# Patient Record
Sex: FEMALE | Race: WHITE | NOT HISPANIC OR LATINO | URBAN - METROPOLITAN AREA
[De-identification: names, ages, dates, MRNs, and addresses within clinical notes are randomized per-mention and may not be internally consistent; named-entity substitution may affect disease eponyms.]

---

## 2021-10-01 ENCOUNTER — INPATIENT (INPATIENT)
Facility: HOSPITAL | Age: 81
LOS: 2 days | Discharge: HOME | DRG: 281 | End: 2021-10-04
Attending: HOSPITALIST | Admitting: STUDENT IN AN ORGANIZED HEALTH CARE EDUCATION/TRAINING PROGRAM
Payer: MEDICARE

## 2021-10-01 ENCOUNTER — EMERGENCY (EMERGENCY)
Facility: HOSPITAL | Age: 81
LOS: 0 days | Discharge: ANOTHER HOSPITAL | End: 2021-10-01
Attending: STUDENT IN AN ORGANIZED HEALTH CARE EDUCATION/TRAINING PROGRAM
Payer: MEDICARE

## 2021-10-01 VITALS
OXYGEN SATURATION: 98 % | HEART RATE: 106 BPM | RESPIRATION RATE: 18 BRPM | SYSTOLIC BLOOD PRESSURE: 131 MMHG | HEIGHT: 64 IN | DIASTOLIC BLOOD PRESSURE: 82 MMHG | WEIGHT: 119.93 LBS

## 2021-10-01 VITALS
HEART RATE: 80 BPM | RESPIRATION RATE: 18 BRPM | DIASTOLIC BLOOD PRESSURE: 72 MMHG | SYSTOLIC BLOOD PRESSURE: 151 MMHG | TEMPERATURE: 98 F | OXYGEN SATURATION: 96 %

## 2021-10-01 VITALS
SYSTOLIC BLOOD PRESSURE: 130 MMHG | TEMPERATURE: 98 F | OXYGEN SATURATION: 95 % | RESPIRATION RATE: 21 BRPM | DIASTOLIC BLOOD PRESSURE: 82 MMHG

## 2021-10-01 DIAGNOSIS — N39.0 URINARY TRACT INFECTION, SITE NOT SPECIFIED: ICD-10-CM

## 2021-10-01 DIAGNOSIS — Z96.659 PRESENCE OF UNSPECIFIED ARTIFICIAL KNEE JOINT: Chronic | ICD-10-CM

## 2021-10-01 DIAGNOSIS — I10 ESSENTIAL (PRIMARY) HYPERTENSION: ICD-10-CM

## 2021-10-01 DIAGNOSIS — Z87.81 PERSONAL HISTORY OF (HEALED) TRAUMATIC FRACTURE: Chronic | ICD-10-CM

## 2021-10-01 DIAGNOSIS — Z79.01 LONG TERM (CURRENT) USE OF ANTICOAGULANTS: ICD-10-CM

## 2021-10-01 DIAGNOSIS — Z86.73 PERSONAL HISTORY OF TRANSIENT ISCHEMIC ATTACK (TIA), AND CEREBRAL INFARCTION WITHOUT RESIDUAL DEFICITS: ICD-10-CM

## 2021-10-01 DIAGNOSIS — D69.6 THROMBOCYTOPENIA, UNSPECIFIED: ICD-10-CM

## 2021-10-01 DIAGNOSIS — I21.4 NON-ST ELEVATION (NSTEMI) MYOCARDIAL INFARCTION: ICD-10-CM

## 2021-10-01 DIAGNOSIS — R25.1 TREMOR, UNSPECIFIED: ICD-10-CM

## 2021-10-01 DIAGNOSIS — Z20.822 CONTACT WITH AND (SUSPECTED) EXPOSURE TO COVID-19: ICD-10-CM

## 2021-10-01 DIAGNOSIS — Z29.9 ENCOUNTER FOR PROPHYLACTIC MEASURES, UNSPECIFIED: ICD-10-CM

## 2021-10-01 DIAGNOSIS — R06.02 SHORTNESS OF BREATH: ICD-10-CM

## 2021-10-01 DIAGNOSIS — R07.9 CHEST PAIN, UNSPECIFIED: ICD-10-CM

## 2021-10-01 DIAGNOSIS — R07.89 OTHER CHEST PAIN: ICD-10-CM

## 2021-10-01 DIAGNOSIS — R35.0 FREQUENCY OF MICTURITION: ICD-10-CM

## 2021-10-01 LAB
ALBUMIN SERPL ELPH-MCNC: 3.8 G/DL — SIGNIFICANT CHANGE UP (ref 3.3–5)
ALP SERPL-CCNC: 97 U/L — SIGNIFICANT CHANGE UP (ref 40–120)
ALT FLD-CCNC: 32 U/L — SIGNIFICANT CHANGE UP (ref 12–78)
ANION GAP SERPL CALC-SCNC: 5 MMOL/L — SIGNIFICANT CHANGE UP (ref 5–17)
APPEARANCE UR: CLEAR — SIGNIFICANT CHANGE UP
APTT BLD: 34.5 SEC — SIGNIFICANT CHANGE UP (ref 27.5–35.5)
AST SERPL-CCNC: 58 U/L — HIGH (ref 15–37)
BACTERIA # UR AUTO: ABNORMAL
BASOPHILS # BLD AUTO: 0.04 K/UL — SIGNIFICANT CHANGE UP (ref 0–0.2)
BASOPHILS NFR BLD AUTO: 0.4 % — SIGNIFICANT CHANGE UP (ref 0–2)
BILIRUB SERPL-MCNC: 0.7 MG/DL — SIGNIFICANT CHANGE UP (ref 0.2–1.2)
BILIRUB UR-MCNC: NEGATIVE — SIGNIFICANT CHANGE UP
BUN SERPL-MCNC: 18 MG/DL — SIGNIFICANT CHANGE UP (ref 7–23)
CALCIUM SERPL-MCNC: 9.2 MG/DL — SIGNIFICANT CHANGE UP (ref 8.5–10.1)
CHLORIDE SERPL-SCNC: 109 MMOL/L — HIGH (ref 96–108)
CO2 SERPL-SCNC: 29 MMOL/L — SIGNIFICANT CHANGE UP (ref 22–31)
COLOR SPEC: YELLOW — SIGNIFICANT CHANGE UP
CREAT SERPL-MCNC: 0.86 MG/DL — SIGNIFICANT CHANGE UP (ref 0.5–1.3)
D DIMER BLD IA.RAPID-MCNC: 164 NG/ML DDU — SIGNIFICANT CHANGE UP
DIFF PNL FLD: ABNORMAL
EOSINOPHIL # BLD AUTO: 0.02 K/UL — SIGNIFICANT CHANGE UP (ref 0–0.5)
EOSINOPHIL NFR BLD AUTO: 0.2 % — SIGNIFICANT CHANGE UP (ref 0–6)
EPI CELLS # UR: SIGNIFICANT CHANGE UP
FLUAV AG NPH QL: SIGNIFICANT CHANGE UP
FLUBV AG NPH QL: SIGNIFICANT CHANGE UP
GLUCOSE SERPL-MCNC: 124 MG/DL — HIGH (ref 70–99)
GLUCOSE UR QL: NEGATIVE MG/DL — SIGNIFICANT CHANGE UP
HCT VFR BLD CALC: 43.8 % — SIGNIFICANT CHANGE UP (ref 34.5–45)
HGB BLD-MCNC: 14.3 G/DL — SIGNIFICANT CHANGE UP (ref 11.5–15.5)
IMM GRANULOCYTES NFR BLD AUTO: 0.3 % — SIGNIFICANT CHANGE UP (ref 0–1.5)
INR BLD: 1.32 RATIO — HIGH (ref 0.88–1.16)
KETONES UR-MCNC: NEGATIVE — SIGNIFICANT CHANGE UP
LEUKOCYTE ESTERASE UR-ACNC: ABNORMAL
LYMPHOCYTES # BLD AUTO: 1.33 K/UL — SIGNIFICANT CHANGE UP (ref 1–3.3)
LYMPHOCYTES # BLD AUTO: 12.2 % — LOW (ref 13–44)
MCHC RBC-ENTMCNC: 29.2 PG — SIGNIFICANT CHANGE UP (ref 27–34)
MCHC RBC-ENTMCNC: 32.6 GM/DL — SIGNIFICANT CHANGE UP (ref 32–36)
MCV RBC AUTO: 89.6 FL — SIGNIFICANT CHANGE UP (ref 80–100)
MONOCYTES # BLD AUTO: 0.69 K/UL — SIGNIFICANT CHANGE UP (ref 0–0.9)
MONOCYTES NFR BLD AUTO: 6.3 % — SIGNIFICANT CHANGE UP (ref 2–14)
NEUTROPHILS # BLD AUTO: 8.76 K/UL — HIGH (ref 1.8–7.4)
NEUTROPHILS NFR BLD AUTO: 80.6 % — HIGH (ref 43–77)
NITRITE UR-MCNC: POSITIVE
NRBC # BLD: 0 /100 WBCS — SIGNIFICANT CHANGE UP (ref 0–0)
NT-PROBNP SERPL-SCNC: 2138 PG/ML — HIGH (ref 0–450)
PH UR: 5 — SIGNIFICANT CHANGE UP (ref 5–8)
PLATELET # BLD AUTO: 92 K/UL — LOW (ref 150–400)
POTASSIUM SERPL-MCNC: 3.7 MMOL/L — SIGNIFICANT CHANGE UP (ref 3.5–5.3)
POTASSIUM SERPL-SCNC: 3.7 MMOL/L — SIGNIFICANT CHANGE UP (ref 3.5–5.3)
PROT SERPL-MCNC: 7.6 GM/DL — SIGNIFICANT CHANGE UP (ref 6–8.3)
PROT UR-MCNC: NEGATIVE MG/DL — SIGNIFICANT CHANGE UP
PROTHROM AB SERPL-ACNC: 15.1 SEC — HIGH (ref 10.6–13.6)
RBC # BLD: 4.89 M/UL — SIGNIFICANT CHANGE UP (ref 3.8–5.2)
RBC # FLD: 12.9 % — SIGNIFICANT CHANGE UP (ref 10.3–14.5)
RBC CASTS # UR COMP ASSIST: ABNORMAL /HPF (ref 0–4)
SARS-COV-2 RNA SPEC QL NAA+PROBE: SIGNIFICANT CHANGE UP
SODIUM SERPL-SCNC: 143 MMOL/L — SIGNIFICANT CHANGE UP (ref 135–145)
SP GR SPEC: 1.01 — SIGNIFICANT CHANGE UP (ref 1.01–1.02)
TROPONIN I SERPL-MCNC: 4.63 NG/ML — HIGH (ref 0.01–0.04)
TROPONIN I SERPL-MCNC: 5.3 NG/ML — HIGH (ref 0.01–0.04)
TROPONIN T, HIGH SENSITIVITY RESULT: 505 NG/L — HIGH (ref 0–51)
UROBILINOGEN FLD QL: NEGATIVE MG/DL — SIGNIFICANT CHANGE UP
WBC # BLD: 10.87 K/UL — HIGH (ref 3.8–10.5)
WBC # FLD AUTO: 10.87 K/UL — HIGH (ref 3.8–10.5)
WBC UR QL: ABNORMAL

## 2021-10-01 PROCEDURE — 71045 X-RAY EXAM CHEST 1 VIEW: CPT | Mod: 26

## 2021-10-01 PROCEDURE — 93010 ELECTROCARDIOGRAM REPORT: CPT

## 2021-10-01 PROCEDURE — 99223 1ST HOSP IP/OBS HIGH 75: CPT

## 2021-10-01 PROCEDURE — 99285 EMERGENCY DEPT VISIT HI MDM: CPT

## 2021-10-01 PROCEDURE — 70450 CT HEAD/BRAIN W/O DYE: CPT | Mod: 26,MA

## 2021-10-01 RX ORDER — ACETAMINOPHEN 500 MG
650 TABLET ORAL EVERY 6 HOURS
Refills: 0 | Status: DISCONTINUED | OUTPATIENT
Start: 2021-10-01 | End: 2021-10-04

## 2021-10-01 RX ORDER — ASPIRIN/CALCIUM CARB/MAGNESIUM 324 MG
162 TABLET ORAL ONCE
Refills: 0 | Status: COMPLETED | OUTPATIENT
Start: 2021-10-01 | End: 2021-10-01

## 2021-10-01 RX ORDER — INFLUENZA VIRUS VACCINE 15; 15; 15; 15 UG/.5ML; UG/.5ML; UG/.5ML; UG/.5ML
0.5 SUSPENSION INTRAMUSCULAR ONCE
Refills: 0 | Status: DISCONTINUED | OUTPATIENT
Start: 2021-10-01 | End: 2021-10-04

## 2021-10-01 RX ORDER — ATENOLOL 25 MG/1
0 TABLET ORAL
Qty: 0 | Refills: 0 | DISCHARGE

## 2021-10-01 RX ORDER — APIXABAN 2.5 MG/1
1 TABLET, FILM COATED ORAL
Qty: 0 | Refills: 0 | DISCHARGE

## 2021-10-01 RX ORDER — ATENOLOL 25 MG/1
1 TABLET ORAL
Qty: 0 | Refills: 0 | DISCHARGE

## 2021-10-01 RX ORDER — ATORVASTATIN CALCIUM 80 MG/1
0 TABLET, FILM COATED ORAL
Qty: 0 | Refills: 0 | DISCHARGE

## 2021-10-01 RX ORDER — CEFTRIAXONE 500 MG/1
1000 INJECTION, POWDER, FOR SOLUTION INTRAMUSCULAR; INTRAVENOUS ONCE
Refills: 0 | Status: COMPLETED | OUTPATIENT
Start: 2021-10-01 | End: 2021-10-01

## 2021-10-01 RX ORDER — APIXABAN 2.5 MG/1
0 TABLET, FILM COATED ORAL
Qty: 0 | Refills: 2 | DISCHARGE

## 2021-10-01 RX ORDER — ATORVASTATIN CALCIUM 80 MG/1
40 TABLET, FILM COATED ORAL AT BEDTIME
Refills: 0 | Status: DISCONTINUED | OUTPATIENT
Start: 2021-10-01 | End: 2021-10-04

## 2021-10-01 RX ORDER — ATENOLOL 25 MG/1
25 TABLET ORAL ONCE
Refills: 0 | Status: COMPLETED | OUTPATIENT
Start: 2021-10-01 | End: 2021-10-01

## 2021-10-01 RX ORDER — CLOPIDOGREL BISULFATE 75 MG/1
600 TABLET, FILM COATED ORAL ONCE
Refills: 0 | Status: COMPLETED | OUTPATIENT
Start: 2021-10-01 | End: 2021-10-01

## 2021-10-01 RX ORDER — HEPARIN SODIUM 5000 [USP'U]/ML
3200 INJECTION INTRAVENOUS; SUBCUTANEOUS ONCE
Refills: 0 | Status: COMPLETED | OUTPATIENT
Start: 2021-10-01 | End: 2021-10-01

## 2021-10-01 RX ORDER — CLOPIDOGREL BISULFATE 75 MG/1
75 TABLET, FILM COATED ORAL DAILY
Refills: 0 | Status: DISCONTINUED | OUTPATIENT
Start: 2021-10-02 | End: 2021-10-04

## 2021-10-01 RX ORDER — ATENOLOL 25 MG/1
25 TABLET ORAL DAILY
Refills: 0 | Status: DISCONTINUED | OUTPATIENT
Start: 2021-10-01 | End: 2021-10-04

## 2021-10-01 RX ORDER — ATORVASTATIN CALCIUM 80 MG/1
80 TABLET, FILM COATED ORAL AT BEDTIME
Refills: 0 | Status: DISCONTINUED | OUTPATIENT
Start: 2021-10-01 | End: 2021-10-01

## 2021-10-01 RX ORDER — HEPARIN SODIUM 5000 [USP'U]/ML
3200 INJECTION INTRAVENOUS; SUBCUTANEOUS EVERY 6 HOURS
Refills: 0 | Status: DISCONTINUED | OUTPATIENT
Start: 2021-10-01 | End: 2021-10-03

## 2021-10-01 RX ORDER — POTASSIUM CHLORIDE 20 MEQ
0 PACKET (EA) ORAL
Qty: 0 | Refills: 0 | DISCHARGE

## 2021-10-01 RX ORDER — ASPIRIN/CALCIUM CARB/MAGNESIUM 324 MG
81 TABLET ORAL DAILY
Refills: 0 | Status: DISCONTINUED | OUTPATIENT
Start: 2021-10-02 | End: 2021-10-04

## 2021-10-01 RX ORDER — HEPARIN SODIUM 5000 [USP'U]/ML
INJECTION INTRAVENOUS; SUBCUTANEOUS
Qty: 25000 | Refills: 0 | Status: DISCONTINUED | OUTPATIENT
Start: 2021-10-01 | End: 2021-10-03

## 2021-10-01 RX ORDER — LANOLIN ALCOHOL/MO/W.PET/CERES
3 CREAM (GRAM) TOPICAL AT BEDTIME
Refills: 0 | Status: DISCONTINUED | OUTPATIENT
Start: 2021-10-01 | End: 2021-10-04

## 2021-10-01 RX ORDER — LOSARTAN/HYDROCHLOROTHIAZIDE 100MG-25MG
0 TABLET ORAL
Qty: 0 | Refills: 1 | DISCHARGE

## 2021-10-01 RX ADMIN — HEPARIN SODIUM 650 UNIT(S)/HR: 5000 INJECTION INTRAVENOUS; SUBCUTANEOUS at 21:16

## 2021-10-01 RX ADMIN — CEFTRIAXONE 100 MILLIGRAM(S): 500 INJECTION, POWDER, FOR SOLUTION INTRAMUSCULAR; INTRAVENOUS at 15:06

## 2021-10-01 RX ADMIN — CLOPIDOGREL BISULFATE 600 MILLIGRAM(S): 75 TABLET, FILM COATED ORAL at 22:14

## 2021-10-01 RX ADMIN — HEPARIN SODIUM 3200 UNIT(S): 5000 INJECTION INTRAVENOUS; SUBCUTANEOUS at 21:16

## 2021-10-01 RX ADMIN — Medication 162 MILLIGRAM(S): at 14:17

## 2021-10-01 RX ADMIN — Medication 162 MILLIGRAM(S): at 15:15

## 2021-10-01 RX ADMIN — CEFTRIAXONE 1000 MILLIGRAM(S): 500 INJECTION, POWDER, FOR SOLUTION INTRAMUSCULAR; INTRAVENOUS at 15:36

## 2021-10-01 RX ADMIN — ATORVASTATIN CALCIUM 40 MILLIGRAM(S): 80 TABLET, FILM COATED ORAL at 22:13

## 2021-10-01 NOTE — CONSULT NOTE ADULT - ATTENDING COMMENTS
Ms. Escmailla reports no chest discomfort at the time of my evaluation.  Continue DAPT and heparin gtt.  Plan fo [facility].

## 2021-10-01 NOTE — H&P ADULT - NSHPLABSRESULTS_GEN_ALL_CORE
LABS: Personally reviewed labs, imaging, and ECG                          14.3   10.87 )-----------( 92       ( 01 Oct 2021 11:41 )             43.8       10    143  |  109<H>  |  18  ----------------------------<  124<H>  3.7   |  29  |  0.86    Ca    9.2      01 Oct 2021 11:41    TPro  7.6  /  Alb  3.8  /  TBili  0.7  /  DBili  x   /  AST  58<H>  /  ALT  32  /  AlkPhos  97  10-       LIVER FUNCTIONS - ( 01 Oct 2021 11:41 )  Alb: 3.8 g/dL / Pro: 7.6 gm/dL / ALK PHOS: 97 U/L / ALT: 32 U/L / AST: 58 U/L / GGT: x                    Urinalysis Basic - ( 01 Oct 2021 14:15 )    Color: Yellow / Appearance: Clear / S.010 / pH: x  Gluc: x / Ketone: Negative  / Bili: Negative / Urobili: Negative mg/dL   Blood: x / Protein: Negative mg/dL / Nitrite: Positive   Leuk Esterase: Trace / RBC: 3-5 /HPF / WBC 6-10   Sq Epi: x / Non Sq Epi: Few / Bacteria: Many        PT/INR - ( 01 Oct 2021 11:41 )   PT: 15.1 sec;   INR: 1.32 ratio         PTT - ( 01 Oct 2021 11:41 )  PTT:34.5 sec    Lactate Trend      CARDIAC MARKERS ( 01 Oct 2021 16:22 )  4.630 ng/mL / x     / x     / x     / x      CARDIAC MARKERS ( 01 Oct 2021 11:41 )  5.300 ng/mL / x     / x     / x     / x            CAPILLARY BLOOD GLUCOSE                RADIOLOGY & ADDITIONAL TESTS: LABS: Personally reviewed labs, imaging, and ECG                          14.3   10.87 )-----------( 92       ( 01 Oct 2021 11:41 )             43.8       10    143  |  109<H>  |  18  ----------------------------<  124<H>  3.7   |  29  |  0.86    Ca    9.2      01 Oct 2021 11:41    TPro  7.6  /  Alb  3.8  /  TBili  0.7  /  DBili  x   /  AST  58<H>  /  ALT  32  /  AlkPhos  97  10       LIVER FUNCTIONS - ( 01 Oct 2021 11:41 )  Alb: 3.8 g/dL / Pro: 7.6 gm/dL / ALK PHOS: 97 U/L / ALT: 32 U/L / AST: 58 U/L / GGT: x                    Urinalysis Basic - ( 01 Oct 2021 14:15 )    Color: Yellow / Appearance: Clear / S.010 / pH: x  Gluc: x / Ketone: Negative  / Bili: Negative / Urobili: Negative mg/dL   Blood: x / Protein: Negative mg/dL / Nitrite: Positive   Leuk Esterase: Trace / RBC: 3-5 /HPF / WBC 6-10   Sq Epi: x / Non Sq Epi: Few / Bacteria: Many        PT/INR - ( 01 Oct 2021 11:41 )   PT: 15.1 sec;   INR: 1.32 ratio         PTT - ( 01 Oct 2021 11:41 )  PTT:34.5 sec    Lactate Trend      CARDIAC MARKERS ( 01 Oct 2021 16:22 )  4.630 ng/mL / x     / x     / x     / x      CARDIAC MARKERS ( 01 Oct 2021 11:41 )  5.300 ng/mL / x     / x     / x     / x          EKG:  ST depressions in V4-V6  PVC's and PAC      RADIOLOGY & ADDITIONAL TESTS:  < from: CT Head No Cont (10.01.21 @ 12:35) >    IMPRESSION:    No hydrocephalus, acute intracranial hemorrhage, mass effect, or brain edema.    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    Right mastoid air cell effusion, correlate for the presence of mastoiditis.    --- End of Report ---    < end of copied text >      < from: Xray Chest 1 View- PORTABLE-Urgent (10.01.21 @ 11:18) >    Heart is magnified by technique.    No lung or pleural finding evident.    There is possible lung hyperexpansion.    IMPRESSION: Question hyperexpanded lungs.    < end of copied text >

## 2021-10-01 NOTE — H&P ADULT - NSHPPHYSICALEXAM_GEN_ALL_CORE
Vital Signs Last 24 Hrs  T(C): 36.7 (01 Oct 2021 18:05), Max: 36.8 (01 Oct 2021 12:02)  T(F): 98.1 (01 Oct 2021 18:05), Max: 98.2 (01 Oct 2021 12:02)  HR: 80 (01 Oct 2021 18:05) (73 - 106)  BP: 151/72 (01 Oct 2021 18:05) (130/82 - 151/72)  BP(mean): --  RR: 18 (01 Oct 2021 18:05) (12 - 21)  SpO2: 96% (01 Oct 2021 18:05) (95% - 98%)    Physical Exam:  Gen: Alert, well-developed, NAD  HEENT: NCAT, PERRL, EOMI, clear conjunctiva, no scleral icterus, no erythema or exudates in the oropharynx, mmm  Neck: Supple, no JVD, no LAD  CV: RRR, S1S2, no m/r/g  Resp: CTAB, normal respiratory effort  Abd: Soft, NT, ND, normal bowel sounds  Ext: no edema, no clubbing or cyanosis  Neuro: AOx3, CN2-12 grossly intact, SUÁREZ  Skin: warm, perfused Vital Signs Last 24 Hrs  T(C): 36.7 (01 Oct 2021 18:05), Max: 36.8 (01 Oct 2021 12:02)  T(F): 98.1 (01 Oct 2021 18:05), Max: 98.2 (01 Oct 2021 12:02)  HR: 80 (01 Oct 2021 18:05) (73 - 106)  BP: 151/72 (01 Oct 2021 18:05) (130/82 - 151/72)  BP(mean): --  RR: 18 (01 Oct 2021 18:05) (12 - 21)  SpO2: 96% (01 Oct 2021 18:05) (95% - 98%)    Physical Exam:  Gen: Alert, well-developed, NAD  HEENT: NCAT, EOMI, clear conjunctiva, no scleral icterus, no erythema or exudates in the oropharynx, mmm  Neck: no JVD  CV: RRR, S1S2, no murmurs  Resp: CTAB, normal respiratory effort  Abd: Soft, NT, ND  Ext: no edema.  Surgical scar on L knee. LLE smaller than RLE, appears atrophied. no clubbing or cyanosis  Neuro: AOx3, CN2-12 grossly intact, SUÁREZ  Skin: warm, perfused

## 2021-10-01 NOTE — CONSULT NOTE ADULT - SUBJECTIVE AND OBJECTIVE BOX
Patient seen and evaluated at bedside    Chief Complaint:    HPI:  82 yo F with hx of HTN, TIA on Eliquis 2.5 BID for the past 6 yrs, rheumatic MV stenosis, presented to [place] ED for mild SOB and "chest discomfort, no pain" that radiated to bilateral UE. Patient was sitting down and reading a book when this happened and since then had multiple episodes lasting about 30 mins each. The discomfort is so mild that she did not initially come to the hospital. However, yesterday had an episode with her friend when she was "shaking", patient does not remember the episode, which prompted her to come to the hospital. Currently, she denies this chest discomfort and denies SOB. Troponin I at OSH was 5.3 initially that down-trended to 4.6. EKG showed STD in v3-v6 and II, III, and avF. She was loaded with asa 325 and accepted by Dr. Billingsley to [facility] for LHC.     PMHx:   CVA (cerebrovascular accident)  HTN (hypertension)    Current Medications:   acetaminophen   Tablet .. 650 milliGRAM(s) Oral every 6 hours PRN  influenza   Vaccine 0.5 milliLiter(s) IntraMuscular once  melatonin 3 milliGRAM(s) Oral at bedtime PRN    REVIEW OF SYSTEMS:  Constitutional:     [x ] negative [ ] fevers [ ] chills [ ] weight loss [ ] weight gain  HEENT:                  [x ] negative [ ] dry eyes [ ] eye irritation [ ] postnasal drip [ ] nasal congestion  CV:                         [ x] negative  [ ] chest pain [ ] orthopnea [ ] palpitations [ ] murmur  Resp:                     [x ] negative [ ] cough [ ] shortness of breath [ ] dyspnea [ ] wheezing [ ] sputum [ ]hemoptysis  GI:                          [ x] negative [ ] nausea [ ] vomiting [ ] diarrhea [ ] constipation [ ] abd pain [ ] dysphagia   :                        [ x] negative [ ] dysuria [ ] nocturia [ ] hematuria [ ] increased urinary frequency  Musculoskeletal: [x ] negative [ ] back pain [ ] myalgias [ ] arthralgias [ ] fracture  Skin:                       [ x] negative [ ] rash [ ] itch  Neurological:        [ x] negative [ ] headache [ ] dizziness [ ] syncope [ ] weakness [ ] numbness  Psychiatric:           [ x] negative [ ] anxiety [ ] depression  Endocrine:            [ x] negative [ ] diabetes [ ] thyroid problem  Heme/Lymph:      [ x] negative [ ] anemia [ ] bleeding problem  Allergic/Immune: [ x] negative [ ] itchy eyes [ ] nasal discharge [ ] hives [ ] angioedema    [ x] All other systems negative  [ ] Unable to assess ROS due to      Physical Exam:  T(F): 98.1 (10-01), Max: 98.2 (10-01)  HR: 80 (10-01) (73 - 106)  BP: 151/72 (10-01) (130/82 - 151/72)  RR: 18 (10-01)  SpO2: 96% (10-01)  GENERAL: No acute distress, well-developed  HEAD:  Atraumatic, Normocephalic  ENT: EOMI, PERRLA, conjunctiva and sclera clear, Neck supple, No JVD, moist mucosa  CHEST/LUNG: Clear to auscultation bilaterally; No wheeze, equal breath sounds bilaterally   BACK: No spinal tenderness  HEART: Regular rate and rhythm; No murmurs, rubs, or gallops  ABDOMEN: Soft, Nontender, Nondistended; Bowel sounds present  EXTREMITIES:  No clubbing, cyanosis, or edema  PSYCH: Nl behavior, nl affect  NEUROLOGY: AAOx3, non-focal, cranial nerves intact  SKIN: Normal color, No rashes or lesions  LINES:    Cardiovascular Diagnostic Testing:`    ECG: Personally reviewed:    Imaging:    CXR: Personally reviewed    Labs: Personally reviewed                        14.3   10.87 )-----------( 92       ( 01 Oct 2021 11:41 )             43.8     10-01    143  |  109<H>  |  18  ----------------------------<  124<H>  3.7   |  29  |  0.86    Ca    9.2      01 Oct 2021 11:41    TPro  7.6  /  Alb  3.8  /  TBili  0.7  /  DBili  x   /  AST  58<H>  /  ALT  32  /  AlkPhos  97  10-01    PT/INR - ( 01 Oct 2021 11:41 )   PT: 15.1 sec;   INR: 1.32 ratio         PTT - ( 01 Oct 2021 11:41 )  PTT:34.5 sec  Serum Pro-Brain Natriuretic Peptide: 2138 pg/mL (10-01 @ 11:41)

## 2021-10-01 NOTE — ED ADULT NURSE NOTE - OBJECTIVE STATEMENT
Pt presents to ED c/o chest pressure, SOB X2 days. Pt also states the she had "the shakes" in both of her arms yesterday. Pt reports hx of TIAs. She denies dizziness, CP, N/V/D, fever/chills. Neuro intact. Respirations even and unlabored. NAD noted. 12 lead EKG completed. Daughter at bedside.

## 2021-10-01 NOTE — H&P ADULT - PROBLEM SELECTOR PLAN 3
Episode of shaking prior to ED presentation. No alteration in mental status, LOC, confusion per patient.  Possibly chills vs. seizure.    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  - monitor for fevers Thrombocytopenia to 92. Possibly consumptive iso MI.    - Heparin gtt to be started on ACS  - Monitor CBC q12h Thrombocytopenia to 92. Possibly consumptive iso MI.    - Heparin gtt to be started on ACS  - Check CBC daily

## 2021-10-01 NOTE — ED PROVIDER NOTE - MUSCULOSKELETAL, MLM
Spine appears normal, range of motion is not limited, no muscle or joint tenderness. no leg swelling or ttp

## 2021-10-01 NOTE — ED ADULT TRIAGE NOTE - CHIEF COMPLAINT QUOTE
82 y/o female with PMH of right eye retinal detatchment, TIA (2020), HLD. Presents to the ED with c/c of heaviness in the chest, HONG, and not feeling well ongoing for the past 2 days. 82 y/o female with PMH of right eye retinal detatchment, TIA (on eliquis, 2020), HLD. Presents to the ED with c/c of heaviness in the chest, HONG, and not feeling well ongoing for the past 2 days.

## 2021-10-01 NOTE — H&P ADULT - NSHPREVIEWOFSYSTEMS_GEN_ALL_CORE
General: No fevers, chills, fatigue, weight loss  HEENT: No headaches, acute visual changes, rhinorrhea, sore throat, dysphagia  CVS: No chest pain, palpitations  Resp: No cough, dyspnea, wheezing  GI: No abdominal pain, nausea, vomiting, constipation, diarrhea, hematochezia, melena  : No dysuria, frequency, hematuria, or discharge  MSK: No joint pain or swelling  Ext: No edema, no claudication  Skin: No rashes or itching  Heme: No easy bruising or petechiae  Neuro: No confusion, numbness, focal weakness, or loss of consciousness  Endocrine: No excessive heat or cold symptoms, polyuria, polydipsia General: +chills/shaking. No fevers, fatigue, weight loss  HEENT: No headaches, acute visual changes, rhinorrhea, sore throat, dysphagia  CVS: + Chest discomfort. No palpitations  Resp: No cough, dyspnea, wheezing  GI: No abdominal pain, nausea, vomiting, constipation, diarrhea, hematochezia  : + frequency.  No dysuria, hematuria, or discharge  MSK: No joint pain or swelling  Ext: No edema or pain, no claudication  Skin: No rashes or itching  Heme: No easy bruising or petechiae  Neuro: No confusion, numbness, focal weakness, or loss of consciousness  Endocrine: No excessive heat or cold symptoms

## 2021-10-01 NOTE — ED ADULT NURSE NOTE - CHIEF COMPLAINT QUOTE
80 y/o female with PMH of right eye retinal detatchment, TIA (on eliquis, 2020), HLD. Presents to the ED with c/c of heaviness in the chest, HONG, and not feeling well ongoing for the past 2 days.

## 2021-10-01 NOTE — H&P ADULT - ASSESSMENT
81F Hx of TIA (on eliquis 2.5mg BID), Mitral Stenosis d/t rheumatic fever transferred from [facility] for chest discomfort, EKG with ST depressions in V4-V6 and trop elevation consistent with NSTEMI. S/p ASA load.

## 2021-10-01 NOTE — ED PROVIDER NOTE - OBJECTIVE STATEMENT
81f pmhx HTN, CVA/TIA on eliquis daily which she is compliant with presenting for feeling unwell for several days. feels mildly sob with discomfort in her right arm at times. no leg swelling or pain. no exertional component to symptoms. yesterday she had an episode where she reportedly looked nonresponsive, patinet does not remember the episode. asymptomatic today. also experiencing urinary frequency, thoiugh that is chronic. no dysuria or hematuria.

## 2021-10-01 NOTE — ED PROVIDER NOTE - CLINICAL SUMMARY MEDICAL DECISION MAKING FREE TEXT BOX
ed workup significnat for ST depressions in inferolateral leads and troponin elevation to 5.3; no active chest pain or sob in ED. UA indicative of UTI, discussed with Cath lab attending Dr. Billingsley, per reccomendations will transfer to [facility] in preparation for cath; aspirin given; mild thrombocytopenia noted, no bleeding. eliquis last taken this AM

## 2021-10-01 NOTE — CONSULT NOTE ADULT - ASSESSMENT
80 yo F with hx of HTN and CVA presented as a NSTEMI from OSH for LHC.    #NSTEMI  - start hep gtt ACS protocol, hold home Eliquis  - s/p asa load, continue asa 81 daily  - load with plavix 600 x1, then continue plavix 75 daily thereafter (if not already done at OSH)  - would check troponin x2 here  - TTE in the am  - check a1c, lipids, if not recently checked  - can continue home atenolol to maintain HR between 50-60, up-titrate prn  - continue home atorvastatin 40 daily  - monitor on telemetry  - keep K > 4, Mg > 2  - if chest discomfort returns, can trial SL nitro or low dose nitro patch and please notify cardiology  - will get further ischemic eval, likely LHC, in the am    Miriam Franco MD  Cardiology Fellow - PGY 4  Text or Call: [phone]  For all New Consults and Questions:  [email or web address]   Login: [email or web address] 80 yo F with hx of HTN and CVA presented as a NSTEMI from OSH for LHC.    #NSTEMI  - start hep gtt ACS protocol, hold home Eliquis  - s/p asa load, continue asa 81 daily  - load with plavix 600 x1, then continue plavix 75 daily thereafter (if not already done at OSH)  - would check troponin x2 here  - TTE in the am  - check a1c, lipids, if not recently checked  - can continue home atenolol to maintain HR between 50-60, up-titrate prn  - continue home atorvastatin 40 daily  - hold home losartan/HCTZ for now  - monitor on telemetry  - keep K > 4, Mg > 2  - if chest discomfort returns, can trial SL nitro or low dose nitro patch and please notify cardiology  - will get further ischemic eval, likely LHC, in the am    Miriam Franco MD  Cardiology Fellow - PGY 4  Text or Call: [phone]  For all New Consults and Questions:  [email or web address]   Login: [email or web address]

## 2021-10-01 NOTE — H&P ADULT - HISTORY OF PRESENT ILLNESS
81F Hx of HTN, CVA/TIA on eliquis 2.5mg BID? presented to [place] ED for mild SOB with R arm discomfort for several days. no leg swelling or pain. no exertional component to symptoms. Yesterday she had an episode where she reportedly looked nonresponsive, patient does not remember the episode. Asymptomatic today. Also experiencing urinary frequency, though it is chronic. Denies dysuria or hematuria. 81F Hx of HTN, CVA/TIA on eliquis 2.5mg BID presented to [place] ED for mild SOB with R arm discomfort for several days. no leg swelling or pain. no exertional component to symptoms. Yesterday she had an episode where she reportedly looked nonresponsive, patient does not remember the episode. Asymptomatic today. Also experiencing urinary frequency, though it is chronic. Denies dysuria or hematuria.    S/p Aspirin 162mg x2 at [facility] ED. S/p Ceftriaxone 1g.  81F Hx of HTN, CVA/TIA on eliquis 2.5mg BID presented to [place] ED for mild SOB with R arm discomfort for several days. no leg swelling or pain. no exertional component to symptoms. Yesterday she had an episode where she reportedly looked nonresponsive, patient does not remember the episode. Asymptomatic today. Also experiencing urinary frequency, though it is chronic. Denies dysuria or hematuria.    S/p Aspirin 162mg x2 at [facility] ED (10/1 13:40, 14:40). S/p Ceftriaxone 1g.  81F Hx of TIA on eliquis 2.5mg BID for 6 years, rheumatic fever with mitral stenosis, presented to [place] ED for mild SOB with R arm discomfort for several days. no leg swelling or pain. no exertional component to symptoms. Yesterday she had an episode where she reportedly looked nonresponsive, patient does not remember the episode. Asymptomatic today. Also experiencing urinary frequency, though it is chronic. Denies dysuria or hematuria.  chest discomfort intermittent for seversl days 30 minutes at a time   both arms discomfort  currently asymptomatic  S/p Aspirin 162mg x2 at [facility] ED (10/1 13:40, 14:40). S/p Ceftriaxone 1g.  81F Hx of TIA ~ 10 yrs ag (on eliquis 2.5mg BID) for 6 years, Mitral Stenosis d/t rheumatic fever in childhood presented to [place] ED on 10/1 for intermittent chest discomfort bilateral arm discomfort for several days. She was urged by family to go to hospital after she was found have be "shaking" in her upper body for a few seconds.  She was standing up at the time, denies noticing any chills at this time, denies LOC or confusion afterwards. She reports shes noticied mild chest discomfort intermittently throughout last several days, lasting about 30 minutes at a time. Had associated discomfort in bilateral arms.  Denies CP, SOB, leg swelling or pain. No exertional component to symptoms.  Reports she does not have any symptoms at this time.  Admits to experiencing increased urinary frequency x ~ 1 month throughout the night (waking up q1h).  Denies dysuria or hematuria.  Denies fevers.    In [facility] ED:  EKG inferolateral ST depressions and trop-i elevated.  S/p Aspirin 162mg x2 at [facility] ED (10/1 13:40, 14:40).  UA positive, s/p Ceftriaxone 1g.  Accepted by Dr. Billingsley at [facility] for transfer for NSTEMI.  Upon arrival: Af, HDS. 81F Hx of TIA ~10 yrs ago (on eliquis 2.5mg BID), Mitral Stenosis d/t rheumatic fever in childhood, L hip fracture 5 years ago not amenable to surgery (uses cane) presented to [place] ED on 10/1 for intermittent chest discomfort bilateral arm discomfort for several days. She was urged by family to go to hospital after she was found have be "shaking" in her upper body for a few seconds.  She was standing up at the time, denies noticing any chills at this time, denies LOC or confusion afterwards. She reports shes noticied mild chest discomfort intermittently throughout last several days, lasting about 30 minutes at a time. Had associated discomfort in bilateral arms.  Denies CP, SOB, leg swelling or pain. No exertional component to symptoms.  Reports she does not have any symptoms at this time.  Last TTE last year. Admits to experiencing increased urinary frequency x ~ 1 month throughout the night (waking up q1h).  Denies dysuria or hematuria.  Denies fevers.    In [facility] ED:  EKG inferolateral ST depressions and trop-i elevated.  S/p Aspirin 162mg x2 at [facility] ED (10/1 13:40, 14:40).  UA positive, s/p Ceftriaxone 1g.  Accepted by Dr. Billingsley at [facility] for transfer for NSTEMI.  Upon arrival: Af, HDS.

## 2021-10-01 NOTE — H&P ADULT - PROBLEM SELECTOR PLAN 1
Intermittent chest discomfort with bilateral arm discomfort for several days. EKG with ST depressions in V4-V6 and trop elevation consistent with NSTEMI. Trop-i 5.3 -> 4.6.     - S/p  x2 at [facility]. Continue ASA 81mg daily.  - Will start heparin gtt  - Atorvastatin 80mg QHS  - Monitor on telemetry  - TTE ordered  - Cardiology made aware, follow recs re: DAPT and cath plans Intermittent chest discomfort with bilateral arm discomfort for several days. EKG with ST depressions in V4-V6 and trop elevation consistent with NSTEMI. Trop-i 5.3 -> 4.6.     - S/p  x2 at [facility]. Continue ASA 81mg daily.  - Will start heparin gtt  - Atorvastatin 80mg QHS  - Monitor on telemetry  - TTE ordered  - Monitor I/Os, weight  - Cardiology made aware, follow recs re: DAPT and cath plans Intermittent chest discomfort with bilateral arm discomfort for several days. EKG with ST depressions in V4-V6 and trop elevation consistent with NSTEMI. Trop-i 5.3 -> 4.6.     - S/p  x2 at [facility]. Continue ASA 81mg daily.  - Will start heparin gtt  - Will start plavix load 600mg, then maintenance 75mg daily  - Atorvastatin 40mg QHS  - Monitor on telemetry  - TTE ordered.  Monitor I/Os, daily weight.  - Check lipids, A1c  - C/w home atenolol 25mg daily to maintain HR 50-60  - Hold losartan-HCTZ 5-12.5 daily   - F/u Cardiology recs re: DAPT and cath plans Intermittent chest discomfort with bilateral arm discomfort for several days. EKG with ST depressions in V4-V6 and trop elevation consistent with NSTEMI. Trop-i 5.3 -> 4.6.     - S/p  x2 at [facility]. Continue ASA 81mg daily.  - Will start heparin gtt  - Will start plavix load 600mg, then maintenance 75mg daily  - Atorvastatin 40mg QHS  - Monitor on telemetry  - TTE ordered.  Monitor I/Os, daily weight.  - Check lipids, A1c  - C/w home atenolol 25mg daily to maintain HR 50-60  - Hold losartan-HCTZ 5-12.5 daily   - F/u Cardiology recs re: LHC (possibly in AM)

## 2021-10-01 NOTE — H&P ADULT - PROBLEM SELECTOR PLAN 5
Diet: DASH  DVT ppx: will start heparin gtt  PT consult (uses cane to walk s/p L hip fx 5 years ago) Hx of TIA 10 years ago, has been on eliquis. 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  With hx of mitral stenosis/rheumatic valve disease.    - On eliquis at home. Will start heparin gtt (for NSTEMI)  - ASA 81mg daily  - Atorvastatin 80mg QHS

## 2021-10-01 NOTE — H&P ADULT - NSHPSOCIALHISTORY_GEN_ALL_CORE
Marital Status:  (   )    (   ) Single    (   )    (  )   Occupation:   Lives with: (  ) alone  (  ) children   (  ) spouse   (  ) parents  (  ) other  Recent Travel:     Substance Use (street drugs): (  ) never used  (  ) other:  Tobacco Usage:  (   ) never smoked   (   ) former smoker   (   ) current smoker  (     ) pack year  Alcohol Usage:  Sexual History: Marital Status:  (   )    (   ) Single    (   )    (  )   Occupation:   Lives with: (  ) alone  (  ) children   (  ) spouse   (  ) parents  (  ) other  Recent Travel:     hip fracture unable to be operated on - uses cane to walk 5 years ago     Substance Use (street drugs): (  ) never used  (  ) other:  Tobacco Usage:  (   ) never smoked   (   ) former smoker   (   ) current smoker  (     ) pack year  Alcohol Usage:  Sexual History: Marital Status:  (   )    (   ) Single    (   )    ( x )    Lives with: ( x ) alone  (  ) children   (  ) spouse   (  ) parents  (  ) other  With daughter who lives very close    S/p L hip fracture 5 years ago, uses cane to walk     Substance Use (street drugs): ( x ) never used  (  ) other:  Tobacco Usage:  ( x  ) never smoked   (   ) former smoker   (   ) current smoker  (     ) pack year  Alcohol Usage: none    COVID vaccinated moderna x2

## 2021-10-01 NOTE — ED PROVIDER NOTE - CARE PLAN
[facility] Mental Status Exam ([facility])   Score: 8   [place]:   High school educated: 27-30 Normal; 21-26 Mild cognitive impairment; 1-20 Dementia  Less than high school educated: 25-30 Normal; 20-24 Mild cognitive impairment; 1-19 Dementia     1 Principal Discharge DX:	NSTEMI (non-ST elevation myocardial infarction)  Secondary Diagnosis:	Acute UTI

## 2021-10-01 NOTE — H&P ADULT - PROBLEM SELECTOR PLAN 2
UA positive, with increased urinary frequency    - S/p Ceftriaxone 1g at [facility]  - c/w Ceftriaxone 1g QD for total 3 days

## 2021-10-01 NOTE — H&P ADULT - ATTENDING COMMENTS
81F with PMH of rheumatic heart disease, MS, TIA presents as a transfer for NSTEMI. Patient has presented at OSH for chest discomfort and was found to have elevated troponin and EKG changes (ST depressions in the inferolateral leads). Patient was transferred for NSTEMI. Cards recs appreciated, started on heparin drip, aspirin and plavix loaded. Continue home atenolol for HR goal of 50-60. F/u TTE, check lipid panel. Keep K > 4, Mg > 2. Plan for cath in am per cards recs.

## 2021-10-01 NOTE — H&P ADULT - NSICDXPASTMEDICALHX_GEN_ALL_CORE_FT
PAST MEDICAL HISTORY:  CVA (cerebrovascular accident)     HTN (hypertension)      PAST MEDICAL HISTORY:  Brain TIA ~10 yrs ago    HTN (hypertension)

## 2021-10-01 NOTE — H&P ADULT - PROBLEM SELECTOR PLAN 6
Diet: DASH  DVT ppx: will start heparin gtt  PT consult (uses cane to walk s/p L hip fx 5 years ago)

## 2021-10-02 LAB
A1C WITH ESTIMATED AVERAGE GLUCOSE RESULT: 5.9 % — HIGH (ref 4–5.6)
ALBUMIN SERPL ELPH-MCNC: 4.2 G/DL — SIGNIFICANT CHANGE UP (ref 3.3–5)
ALP SERPL-CCNC: 91 U/L — SIGNIFICANT CHANGE UP (ref 40–120)
ALT FLD-CCNC: 22 U/L — SIGNIFICANT CHANGE UP (ref 10–45)
ANION GAP SERPL CALC-SCNC: 17 MMOL/L — SIGNIFICANT CHANGE UP (ref 5–17)
APTT BLD: 127.2 SEC — CRITICAL HIGH (ref 27.5–35.5)
APTT BLD: 64.3 SEC — HIGH (ref 27.5–35.5)
APTT BLD: 64.6 SEC — HIGH (ref 27.5–35.5)
AST SERPL-CCNC: 45 U/L — HIGH (ref 10–40)
BILIRUB SERPL-MCNC: 0.8 MG/DL — SIGNIFICANT CHANGE UP (ref 0.2–1.2)
BUN SERPL-MCNC: 14 MG/DL — SIGNIFICANT CHANGE UP (ref 7–23)
CALCIUM SERPL-MCNC: 9.6 MG/DL — SIGNIFICANT CHANGE UP (ref 8.4–10.5)
CHLORIDE SERPL-SCNC: 103 MMOL/L — SIGNIFICANT CHANGE UP (ref 96–108)
CHOLEST SERPL-MCNC: 168 MG/DL — SIGNIFICANT CHANGE UP
CO2 SERPL-SCNC: 22 MMOL/L — SIGNIFICANT CHANGE UP (ref 22–31)
COVID-19 SPIKE DOMAIN AB INTERP: POSITIVE
COVID-19 SPIKE DOMAIN ANTIBODY RESULT: >250 U/ML — HIGH
CREAT SERPL-MCNC: 0.7 MG/DL — SIGNIFICANT CHANGE UP (ref 0.5–1.3)
ESTIMATED AVERAGE GLUCOSE: 123 MG/DL — HIGH (ref 68–114)
GLUCOSE SERPL-MCNC: 107 MG/DL — HIGH (ref 70–99)
HCT VFR BLD CALC: 40.3 % — SIGNIFICANT CHANGE UP (ref 34.5–45)
HCT VFR BLD CALC: 41.7 % — SIGNIFICANT CHANGE UP (ref 34.5–45)
HDLC SERPL-MCNC: 51 MG/DL — SIGNIFICANT CHANGE UP
HGB BLD-MCNC: 13.4 G/DL — SIGNIFICANT CHANGE UP (ref 11.5–15.5)
HGB BLD-MCNC: 13.5 G/DL — SIGNIFICANT CHANGE UP (ref 11.5–15.5)
LIPID PNL WITH DIRECT LDL SERPL: 103 MG/DL — HIGH
MAGNESIUM SERPL-MCNC: 1.9 MG/DL — SIGNIFICANT CHANGE UP (ref 1.6–2.6)
MCHC RBC-ENTMCNC: 29.5 PG — SIGNIFICANT CHANGE UP (ref 27–34)
MCHC RBC-ENTMCNC: 29.6 PG — SIGNIFICANT CHANGE UP (ref 27–34)
MCHC RBC-ENTMCNC: 32.4 GM/DL — SIGNIFICANT CHANGE UP (ref 32–36)
MCHC RBC-ENTMCNC: 33.3 GM/DL — SIGNIFICANT CHANGE UP (ref 32–36)
MCV RBC AUTO: 89.2 FL — SIGNIFICANT CHANGE UP (ref 80–100)
MCV RBC AUTO: 91.2 FL — SIGNIFICANT CHANGE UP (ref 80–100)
NON HDL CHOLESTEROL: 117 MG/DL — SIGNIFICANT CHANGE UP
NRBC # BLD: 0 /100 WBCS — SIGNIFICANT CHANGE UP (ref 0–0)
NRBC # BLD: 0 /100 WBCS — SIGNIFICANT CHANGE UP (ref 0–0)
PHOSPHATE SERPL-MCNC: 2.8 MG/DL — SIGNIFICANT CHANGE UP (ref 2.5–4.5)
PLATELET # BLD AUTO: 87 K/UL — LOW (ref 150–400)
PLATELET # BLD AUTO: 90 K/UL — LOW (ref 150–400)
POTASSIUM SERPL-MCNC: 3.5 MMOL/L — SIGNIFICANT CHANGE UP (ref 3.5–5.3)
POTASSIUM SERPL-SCNC: 3.5 MMOL/L — SIGNIFICANT CHANGE UP (ref 3.5–5.3)
PROT SERPL-MCNC: 6.9 G/DL — SIGNIFICANT CHANGE UP (ref 6–8.3)
RBC # BLD: 4.52 M/UL — SIGNIFICANT CHANGE UP (ref 3.8–5.2)
RBC # BLD: 4.57 M/UL — SIGNIFICANT CHANGE UP (ref 3.8–5.2)
RBC # FLD: 12.7 % — SIGNIFICANT CHANGE UP (ref 10.3–14.5)
RBC # FLD: 12.8 % — SIGNIFICANT CHANGE UP (ref 10.3–14.5)
SARS-COV-2 IGG+IGM SERPL QL IA: >250 U/ML — HIGH
SARS-COV-2 IGG+IGM SERPL QL IA: POSITIVE
SODIUM SERPL-SCNC: 142 MMOL/L — SIGNIFICANT CHANGE UP (ref 135–145)
TRIGL SERPL-MCNC: 73 MG/DL — SIGNIFICANT CHANGE UP
TROPONIN T, HIGH SENSITIVITY RESULT: 576 NG/L — HIGH (ref 0–51)
WBC # BLD: 10.25 K/UL — SIGNIFICANT CHANGE UP (ref 3.8–10.5)
WBC # BLD: 10.45 K/UL — SIGNIFICANT CHANGE UP (ref 3.8–10.5)
WBC # FLD AUTO: 10.25 K/UL — SIGNIFICANT CHANGE UP (ref 3.8–10.5)
WBC # FLD AUTO: 10.45 K/UL — SIGNIFICANT CHANGE UP (ref 3.8–10.5)

## 2021-10-02 PROCEDURE — 99233 SBSQ HOSP IP/OBS HIGH 50: CPT

## 2021-10-02 PROCEDURE — 99222 1ST HOSP IP/OBS MODERATE 55: CPT | Mod: GC

## 2021-10-02 RX ORDER — CEFTRIAXONE 500 MG/1
1000 INJECTION, POWDER, FOR SOLUTION INTRAMUSCULAR; INTRAVENOUS EVERY 24 HOURS
Refills: 0 | Status: COMPLETED | OUTPATIENT
Start: 2021-10-02 | End: 2021-10-03

## 2021-10-02 RX ADMIN — HEPARIN SODIUM 500 UNIT(S)/HR: 5000 INJECTION INTRAVENOUS; SUBCUTANEOUS at 18:27

## 2021-10-02 RX ADMIN — HEPARIN SODIUM 500 UNIT(S)/HR: 5000 INJECTION INTRAVENOUS; SUBCUTANEOUS at 05:29

## 2021-10-02 RX ADMIN — ATORVASTATIN CALCIUM 40 MILLIGRAM(S): 80 TABLET, FILM COATED ORAL at 21:52

## 2021-10-02 RX ADMIN — CEFTRIAXONE 100 MILLIGRAM(S): 500 INJECTION, POWDER, FOR SOLUTION INTRAMUSCULAR; INTRAVENOUS at 11:25

## 2021-10-02 RX ADMIN — Medication 81 MILLIGRAM(S): at 11:26

## 2021-10-02 RX ADMIN — HEPARIN SODIUM 0 UNIT(S)/HR: 5000 INJECTION INTRAVENOUS; SUBCUTANEOUS at 04:19

## 2021-10-02 RX ADMIN — HEPARIN SODIUM 500 UNIT(S)/HR: 5000 INJECTION INTRAVENOUS; SUBCUTANEOUS at 12:10

## 2021-10-02 RX ADMIN — CLOPIDOGREL BISULFATE 75 MILLIGRAM(S): 75 TABLET, FILM COATED ORAL at 11:26

## 2021-10-02 RX ADMIN — ATENOLOL 25 MILLIGRAM(S): 25 TABLET ORAL at 00:11

## 2021-10-02 NOTE — PHYSICAL THERAPY INITIAL EVALUATION ADULT - ADDITIONAL COMMENTS
Prior to admission pt reports being independent of all ADL's & functional mobility using SAC. Pt owns RW, SAC, commode, shower chair and grab bars if needed. Pt resides in house with dtr (dtr lives up stairs), 3 steps to enter, pt resides on first floor. (-) driving- friend goes grocery shopping for her.

## 2021-10-02 NOTE — PROVIDER CONTACT NOTE (OTHER) - BACKGROUND
Patient admitted with chest pain. NSTEMI   PMH: Brain TIA, HTN, UTI. Patient on heparin gtt ACS nomogram at 5 mL/hour.

## 2021-10-02 NOTE — PHYSICAL THERAPY INITIAL EVALUATION ADULT - MANUAL MUSCLE TESTING RESULTS, REHAB EVAL
B/l UE's & LE's grossly 4+/5. Pt with LLD (LLE shorter than RLE-has shoe lift)/grossly assessed due to

## 2021-10-02 NOTE — PHYSICAL THERAPY INITIAL EVALUATION ADULT - PERTINENT HX OF CURRENT PROBLEM, REHAB EVAL
81F Hx of TIA (on eliquis 2.5mg BID), Mitral Stenosis d/t rheumatic fever transferred from [facility] for chest discomfort, EKG with ST depressions in V4-V6 and trop elevation consistent with NSTEMI. S/p ASA load.

## 2021-10-02 NOTE — PROVIDER CONTACT NOTE (OTHER) - ACTION/TREATMENT ORDERED:
MD Cleveland notified. Orders received to hold atenolol.  Dr. Hernandez also notified and evaluated the patient at the bedside.

## 2021-10-02 NOTE — PHYSICAL THERAPY INITIAL EVALUATION ADULT - ACTIVE RANGE OF MOTION EXAMINATION, REHAB EVAL
tiffany. upper extremity Active ROM was WNL (within normal limits)/bilateral lower extremity Active ROM was WNL (within normal limits)

## 2021-10-02 NOTE — PROVIDER CONTACT NOTE (OTHER) - ASSESSMENT
Patient A&O4. Patient able to make needs known. Patient denies CP, SOB, Discomfort, headache, dizziness.  BP repeated 98/62.  HR 65- 80 on cardiac monitor. Temp: 98, RR 17  o2 sat 95- 98 on Room air.

## 2021-10-03 LAB
ANION GAP SERPL CALC-SCNC: 15 MMOL/L — SIGNIFICANT CHANGE UP (ref 5–17)
APTT BLD: 40.8 SEC — HIGH (ref 27.5–35.5)
APTT BLD: 66.9 SEC — HIGH (ref 27.5–35.5)
BUN SERPL-MCNC: 16 MG/DL — SIGNIFICANT CHANGE UP (ref 7–23)
CALCIUM SERPL-MCNC: 9 MG/DL — SIGNIFICANT CHANGE UP (ref 8.4–10.5)
CHLORIDE SERPL-SCNC: 104 MMOL/L — SIGNIFICANT CHANGE UP (ref 96–108)
CK MB BLD-MCNC: 2.9 % — SIGNIFICANT CHANGE UP (ref 0–3.5)
CK MB CFR SERPL CALC: 3.7 NG/ML — SIGNIFICANT CHANGE UP (ref 0–3.8)
CK SERPL-CCNC: 129 U/L — SIGNIFICANT CHANGE UP (ref 25–170)
CO2 SERPL-SCNC: 23 MMOL/L — SIGNIFICANT CHANGE UP (ref 22–31)
CREAT SERPL-MCNC: 0.73 MG/DL — SIGNIFICANT CHANGE UP (ref 0.5–1.3)
GLUCOSE SERPL-MCNC: 151 MG/DL — HIGH (ref 70–99)
HCT VFR BLD CALC: 38 % — SIGNIFICANT CHANGE UP (ref 34.5–45)
HCT VFR BLD CALC: 38.6 % — SIGNIFICANT CHANGE UP (ref 34.5–45)
HCT VFR BLD CALC: 41 % — SIGNIFICANT CHANGE UP (ref 34.5–45)
HGB BLD-MCNC: 12.3 G/DL — SIGNIFICANT CHANGE UP (ref 11.5–15.5)
HGB BLD-MCNC: 12.3 G/DL — SIGNIFICANT CHANGE UP (ref 11.5–15.5)
HGB BLD-MCNC: 13.3 G/DL — SIGNIFICANT CHANGE UP (ref 11.5–15.5)
MCHC RBC-ENTMCNC: 29.1 PG — SIGNIFICANT CHANGE UP (ref 27–34)
MCHC RBC-ENTMCNC: 29.2 PG — SIGNIFICANT CHANGE UP (ref 27–34)
MCHC RBC-ENTMCNC: 29.5 PG — SIGNIFICANT CHANGE UP (ref 27–34)
MCHC RBC-ENTMCNC: 31.9 GM/DL — LOW (ref 32–36)
MCHC RBC-ENTMCNC: 32.4 GM/DL — SIGNIFICANT CHANGE UP (ref 32–36)
MCHC RBC-ENTMCNC: 32.4 GM/DL — SIGNIFICANT CHANGE UP (ref 32–36)
MCV RBC AUTO: 90.3 FL — SIGNIFICANT CHANGE UP (ref 80–100)
MCV RBC AUTO: 90.9 FL — SIGNIFICANT CHANGE UP (ref 80–100)
MCV RBC AUTO: 91.5 FL — SIGNIFICANT CHANGE UP (ref 80–100)
NRBC # BLD: 0 /100 WBCS — SIGNIFICANT CHANGE UP (ref 0–0)
PLATELET # BLD AUTO: 87 K/UL — LOW (ref 150–400)
PLATELET # BLD AUTO: 94 K/UL — LOW (ref 150–400)
PLATELET # BLD AUTO: 99 K/UL — LOW (ref 150–400)
POTASSIUM SERPL-MCNC: 3.6 MMOL/L — SIGNIFICANT CHANGE UP (ref 3.5–5.3)
POTASSIUM SERPL-SCNC: 3.6 MMOL/L — SIGNIFICANT CHANGE UP (ref 3.5–5.3)
RBC # BLD: 4.21 M/UL — SIGNIFICANT CHANGE UP (ref 3.8–5.2)
RBC # BLD: 4.22 M/UL — SIGNIFICANT CHANGE UP (ref 3.8–5.2)
RBC # BLD: 4.51 M/UL — SIGNIFICANT CHANGE UP (ref 3.8–5.2)
RBC # FLD: 12.9 % — SIGNIFICANT CHANGE UP (ref 10.3–14.5)
RBC # FLD: 13 % — SIGNIFICANT CHANGE UP (ref 10.3–14.5)
RBC # FLD: 13 % — SIGNIFICANT CHANGE UP (ref 10.3–14.5)
SODIUM SERPL-SCNC: 142 MMOL/L — SIGNIFICANT CHANGE UP (ref 135–145)
TROPONIN T, HIGH SENSITIVITY RESULT: 484 NG/L — HIGH (ref 0–51)
WBC # BLD: 10.09 K/UL — SIGNIFICANT CHANGE UP (ref 3.8–10.5)
WBC # BLD: 7.72 K/UL — SIGNIFICANT CHANGE UP (ref 3.8–10.5)
WBC # BLD: 8.53 K/UL — SIGNIFICANT CHANGE UP (ref 3.8–10.5)
WBC # FLD AUTO: 10.09 K/UL — SIGNIFICANT CHANGE UP (ref 3.8–10.5)
WBC # FLD AUTO: 7.72 K/UL — SIGNIFICANT CHANGE UP (ref 3.8–10.5)
WBC # FLD AUTO: 8.53 K/UL — SIGNIFICANT CHANGE UP (ref 3.8–10.5)

## 2021-10-03 PROCEDURE — 99233 SBSQ HOSP IP/OBS HIGH 50: CPT

## 2021-10-03 PROCEDURE — 99152 MOD SED SAME PHYS/QHP 5/>YRS: CPT

## 2021-10-03 PROCEDURE — 93458 L HRT ARTERY/VENTRICLE ANGIO: CPT | Mod: 26

## 2021-10-03 RX ORDER — OXYBUTYNIN CHLORIDE 5 MG
5 TABLET ORAL
Refills: 0 | Status: DISCONTINUED | OUTPATIENT
Start: 2021-10-03 | End: 2021-10-04

## 2021-10-03 RX ORDER — HEPARIN SODIUM 5000 [USP'U]/ML
2000 INJECTION INTRAVENOUS; SUBCUTANEOUS EVERY 6 HOURS
Refills: 0 | Status: DISCONTINUED | OUTPATIENT
Start: 2021-10-03 | End: 2021-10-04

## 2021-10-03 RX ORDER — HEPARIN SODIUM 5000 [USP'U]/ML
500 INJECTION INTRAVENOUS; SUBCUTANEOUS
Qty: 25000 | Refills: 0 | Status: DISCONTINUED | OUTPATIENT
Start: 2021-10-03 | End: 2021-10-04

## 2021-10-03 RX ORDER — HEPARIN SODIUM 5000 [USP'U]/ML
4000 INJECTION INTRAVENOUS; SUBCUTANEOUS EVERY 6 HOURS
Refills: 0 | Status: DISCONTINUED | OUTPATIENT
Start: 2021-10-03 | End: 2021-10-04

## 2021-10-03 RX ADMIN — CEFTRIAXONE 100 MILLIGRAM(S): 500 INJECTION, POWDER, FOR SOLUTION INTRAMUSCULAR; INTRAVENOUS at 11:36

## 2021-10-03 RX ADMIN — ATORVASTATIN CALCIUM 40 MILLIGRAM(S): 80 TABLET, FILM COATED ORAL at 22:19

## 2021-10-03 RX ADMIN — HEPARIN SODIUM 600 UNIT(S)/HR: 5000 INJECTION INTRAVENOUS; SUBCUTANEOUS at 23:19

## 2021-10-03 RX ADMIN — ATENOLOL 25 MILLIGRAM(S): 25 TABLET ORAL at 05:30

## 2021-10-03 RX ADMIN — Medication 5 MILLIGRAM(S): at 18:09

## 2021-10-03 RX ADMIN — HEPARIN SODIUM 500 UNIT(S)/HR: 5000 INJECTION INTRAVENOUS; SUBCUTANEOUS at 16:31

## 2021-10-03 RX ADMIN — Medication 81 MILLIGRAM(S): at 08:17

## 2021-10-03 RX ADMIN — HEPARIN SODIUM 2000 UNIT(S): 5000 INJECTION INTRAVENOUS; SUBCUTANEOUS at 23:24

## 2021-10-03 RX ADMIN — HEPARIN SODIUM 500 UNIT(S)/HR: 5000 INJECTION INTRAVENOUS; SUBCUTANEOUS at 05:31

## 2021-10-03 RX ADMIN — CLOPIDOGREL BISULFATE 75 MILLIGRAM(S): 75 TABLET, FILM COATED ORAL at 08:17

## 2021-10-03 NOTE — PROVIDER CONTACT NOTE (CRITICAL VALUE NOTIFICATION) - ACTION/TREATMENT ORDERED:
MD Cleveland notified. Follow ACS nomogram. Will continue to monitor.
MD Cleveland notified. Repeat troponin lab with AM labs. STAT dose of atenolol to be given as ordered. Will continue to monitor.
CRISTOBAL Damon notified. labs reviewed. Trops trending down.  No further orders taken.

## 2021-10-03 NOTE — PROVIDER CONTACT NOTE (CRITICAL VALUE NOTIFICATION) - BACKGROUND
82 yo female admitted for NSTEMI/CP. PMH Brain TIA, HTN,
Patient admitted for chest pain. On heparin gtt ACS nomogram @ 6.5 mL/hour.
Patient admitted with chest pain  On heparin gtt @6.5 mL/hour, ACS nomogram.

## 2021-10-03 NOTE — PROVIDER CONTACT NOTE (CRITICAL VALUE NOTIFICATION) - ASSESSMENT
Pt in cath lab. VSS on RA prior to patient being sent to cath lab. No complaints of CP or SOB.
Patient A&O4. Patient able to make needs known. Patient denies CP, SOB, headache. VSS. No SXS of active bleeding noted.
Patient A&O4. Patient able to make needs known. Patient denies CP, SOB. VSS.

## 2021-10-04 ENCOUNTER — TRANSCRIPTION ENCOUNTER (OUTPATIENT)
Age: 81
End: 2021-10-04

## 2021-10-04 VITALS
DIASTOLIC BLOOD PRESSURE: 58 MMHG | OXYGEN SATURATION: 96 % | SYSTOLIC BLOOD PRESSURE: 105 MMHG | TEMPERATURE: 98 F | RESPIRATION RATE: 18 BRPM | HEART RATE: 68 BPM

## 2021-10-04 DIAGNOSIS — I05.0 RHEUMATIC MITRAL STENOSIS: ICD-10-CM

## 2021-10-04 DIAGNOSIS — I34.0 NONRHEUMATIC MITRAL (VALVE) INSUFFICIENCY: ICD-10-CM

## 2021-10-04 LAB
ANION GAP SERPL CALC-SCNC: 15 MMOL/L — SIGNIFICANT CHANGE UP (ref 5–17)
APTT BLD: 94.9 SEC — HIGH (ref 27.5–35.5)
BUN SERPL-MCNC: 18 MG/DL — SIGNIFICANT CHANGE UP (ref 7–23)
CALCIUM SERPL-MCNC: 8.7 MG/DL — SIGNIFICANT CHANGE UP (ref 8.4–10.5)
CHLORIDE SERPL-SCNC: 107 MMOL/L — SIGNIFICANT CHANGE UP (ref 96–108)
CO2 SERPL-SCNC: 22 MMOL/L — SIGNIFICANT CHANGE UP (ref 22–31)
CREAT SERPL-MCNC: 0.72 MG/DL — SIGNIFICANT CHANGE UP (ref 0.5–1.3)
GLUCOSE SERPL-MCNC: 114 MG/DL — HIGH (ref 70–99)
HCT VFR BLD CALC: 36.4 % — SIGNIFICANT CHANGE UP (ref 34.5–45)
HGB BLD-MCNC: 12.2 G/DL — SIGNIFICANT CHANGE UP (ref 11.5–15.5)
MAGNESIUM SERPL-MCNC: 2 MG/DL — SIGNIFICANT CHANGE UP (ref 1.6–2.6)
MCHC RBC-ENTMCNC: 30.3 PG — SIGNIFICANT CHANGE UP (ref 27–34)
MCHC RBC-ENTMCNC: 33.5 GM/DL — SIGNIFICANT CHANGE UP (ref 32–36)
MCV RBC AUTO: 90.3 FL — SIGNIFICANT CHANGE UP (ref 80–100)
NRBC # BLD: 0 /100 WBCS — SIGNIFICANT CHANGE UP (ref 0–0)
PLATELET # BLD AUTO: 102 K/UL — LOW (ref 150–400)
POTASSIUM SERPL-MCNC: 3.6 MMOL/L — SIGNIFICANT CHANGE UP (ref 3.5–5.3)
POTASSIUM SERPL-SCNC: 3.6 MMOL/L — SIGNIFICANT CHANGE UP (ref 3.5–5.3)
RBC # BLD: 4.03 M/UL — SIGNIFICANT CHANGE UP (ref 3.8–5.2)
RBC # FLD: 12.8 % — SIGNIFICANT CHANGE UP (ref 10.3–14.5)
SODIUM SERPL-SCNC: 144 MMOL/L — SIGNIFICANT CHANGE UP (ref 135–145)
WBC # BLD: 8.44 K/UL — SIGNIFICANT CHANGE UP (ref 3.8–10.5)
WBC # FLD AUTO: 8.44 K/UL — SIGNIFICANT CHANGE UP (ref 3.8–10.5)

## 2021-10-04 PROCEDURE — 80061 LIPID PANEL: CPT

## 2021-10-04 PROCEDURE — 84484 ASSAY OF TROPONIN QUANT: CPT

## 2021-10-04 PROCEDURE — 86769 SARS-COV-2 COVID-19 ANTIBODY: CPT

## 2021-10-04 PROCEDURE — 99152 MOD SED SAME PHYS/QHP 5/>YRS: CPT

## 2021-10-04 PROCEDURE — C8929: CPT

## 2021-10-04 PROCEDURE — 82553 CREATINE MB FRACTION: CPT

## 2021-10-04 PROCEDURE — 80048 BASIC METABOLIC PNL TOTAL CA: CPT

## 2021-10-04 PROCEDURE — 84100 ASSAY OF PHOSPHORUS: CPT

## 2021-10-04 PROCEDURE — 80053 COMPREHEN METABOLIC PANEL: CPT

## 2021-10-04 PROCEDURE — 83735 ASSAY OF MAGNESIUM: CPT

## 2021-10-04 PROCEDURE — 93306 TTE W/DOPPLER COMPLETE: CPT | Mod: 26

## 2021-10-04 PROCEDURE — C1894: CPT

## 2021-10-04 PROCEDURE — C1887: CPT

## 2021-10-04 PROCEDURE — 85027 COMPLETE CBC AUTOMATED: CPT

## 2021-10-04 PROCEDURE — 36415 COLL VENOUS BLD VENIPUNCTURE: CPT

## 2021-10-04 PROCEDURE — 83036 HEMOGLOBIN GLYCOSYLATED A1C: CPT

## 2021-10-04 PROCEDURE — 93458 L HRT ARTERY/VENTRICLE ANGIO: CPT

## 2021-10-04 PROCEDURE — 97161 PT EVAL LOW COMPLEX 20 MIN: CPT

## 2021-10-04 PROCEDURE — 99239 HOSP IP/OBS DSCHRG MGMT >30: CPT

## 2021-10-04 PROCEDURE — 85730 THROMBOPLASTIN TIME PARTIAL: CPT

## 2021-10-04 PROCEDURE — 82550 ASSAY OF CK (CPK): CPT

## 2021-10-04 PROCEDURE — C1769: CPT

## 2021-10-04 RX ORDER — CEFTRIAXONE 500 MG/1
1000 INJECTION, POWDER, FOR SOLUTION INTRAMUSCULAR; INTRAVENOUS ONCE
Refills: 0 | Status: COMPLETED | OUTPATIENT
Start: 2021-10-04 | End: 2021-10-04

## 2021-10-04 RX ORDER — APIXABAN 2.5 MG/1
2.5 TABLET, FILM COATED ORAL EVERY 12 HOURS
Refills: 0 | Status: DISCONTINUED | OUTPATIENT
Start: 2021-10-04 | End: 2021-10-04

## 2021-10-04 RX ORDER — ASPIRIN/CALCIUM CARB/MAGNESIUM 324 MG
1 TABLET ORAL
Qty: 0 | Refills: 0 | DISCHARGE
Start: 2021-10-04

## 2021-10-04 RX ADMIN — Medication 5 MILLIGRAM(S): at 17:09

## 2021-10-04 RX ADMIN — APIXABAN 2.5 MILLIGRAM(S): 2.5 TABLET, FILM COATED ORAL at 10:24

## 2021-10-04 RX ADMIN — CEFTRIAXONE 100 MILLIGRAM(S): 500 INJECTION, POWDER, FOR SOLUTION INTRAMUSCULAR; INTRAVENOUS at 13:43

## 2021-10-04 RX ADMIN — ATENOLOL 25 MILLIGRAM(S): 25 TABLET ORAL at 05:14

## 2021-10-04 RX ADMIN — Medication 5 MILLIGRAM(S): at 05:14

## 2021-10-04 RX ADMIN — Medication 81 MILLIGRAM(S): at 11:49

## 2021-10-04 RX ADMIN — HEPARIN SODIUM 600 UNIT(S)/HR: 5000 INJECTION INTRAVENOUS; SUBCUTANEOUS at 05:39

## 2021-10-04 NOTE — PROGRESS NOTE ADULT - PROBLEM SELECTOR PLAN 5
Episode of shaking prior to ED presentation. No alteration in mental status, LOC, confusion per patient.    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
Hx of TIA 10 years ago, has been on eliquis. 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  With hx of mitral stenosis/rheumatic valve disease.    - On eliquis at home. heparin gtt (for NSTEMI), resume Eliquis tomorrow  - ASA 81mg daily  - Atorvastatin 80mg QHS
Hx of TIA 10 years ago, has been on eliquis. 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  With hx of mitral stenosis/rheumatic valve disease.    - On eliquis at home. heparin gtt (for NSTEMI)  - ASA 81mg daily  - Atorvastatin 80mg QHS

## 2021-10-04 NOTE — PROGRESS NOTE ADULT - ASSESSMENT
81F Hx of TIA (on eliquis 2.5mg BID), Mitral Stenosis d/t rheumatic fever transferred from hospitals for chest discomfort, EKG with ST depressions in V4-V6 and trop elevation consistent with NSTEMI. S/p ASA load. 
81F Hx of TIA (on eliquis 2.5mg BID), Mitral Stenosis d/t rheumatic fever transferred from hospitals for chest discomfort, EKG with ST depressions in V4-V6 and trop elevation consistent with NSTEMI. S/p ASA load. 
81F Hx of TIA (on eliquis 2.5mg BID), Mitral Stenosis d/t rheumatic fever transferred from [facility] for chest discomfort, EKG with ST depressions in V4-V6 and trop elevation consistent with NSTEMI. S/p ASA load.

## 2021-10-04 NOTE — DISCHARGE NOTE PROVIDER - HOSPITAL COURSE
81F Hx of TIA ~10 yrs ago (on eliquis 2.5mg BID), Mitral Stenosis d/t rheumatic fever in childhood, L hip fracture 5 years ago not amenable to surgery (uses cane) presented to [place] ED on 10/1 for intermittent chest discomfort bilateral arm discomfort for several days. She was urged by family to go to hospital after she was found have be "shaking" in her upper body for a few seconds.    Patient admitted to mild chest discomfort intermittently throughout last several days, lasting about 30 minutes at a time. Had associated discomfort in bilateral arms.     NSTEMI (non-ST elevation myocardial infarction).   Plan: Intermittent chest discomfort with bilateral arm discomfort for several days. EKG with ST depressions in V4-V6 and trop elevation consistent with NSTEMI. Trop-i 5.3 -> 4.6.     UTI   Completed Ceftriaxone 81F Hx of TIA ~10 yrs ago (on eliquis 2.5mg BID), Mitral Stenosis d/t rheumatic fever in childhood, L hip fracture 5 years ago not amenable to surgery (uses cane) presented to [place] ED on 10/1 for intermittent chest discomfort bilateral arm discomfort for several days. Patient admitted to mild chest discomfort intermittently throughout last several days, lasting about 30 minutes at a time. Had associated discomfort in bilateral arms.     Patient admitted with an NSTEMI and started on a Heparin gtt   EKG with ST depressions in V4-V6 and trop elevation consistent with NSTEMI s/p ASA load. Trop-i 5.3 -> 4.6.   10/3: s/p LHC with no stents placed, LAD showed 30% stenosis  Transthoracic Echo: severe MS with rheumatic heart disease   Patient is to continue with ASA 81mg, home atenolol, and Eliquis   Patient dx with UTI during hospital stay which was uncomplicated. Pt. denies any dysuria and is afebrile. Completed last dose of IV Ceftriaxone on 10/4.   Pt. has known history of TIAs. CT head shows chronic bilateral cerebellar and right caudate nucleus infarcts. Pt. is to continue home Eliquis, Atorvastatin, and to follow up with cardiology if needed antiplatelet agent     Patient is medically cleared for discharge and will go home with no PT needs. 81F Hx of TIA ~10 yrs ago (on eliquis 2.5mg BID), Mitral Stenosis d/t rheumatic fever in childhood, L hip fracture 5 years ago not amenable to surgery (uses cane) presented to [place] ED on 10/1 for intermittent chest discomfort bilateral arm discomfort for several days. Patient admitted to mild chest discomfort intermittently throughout last several days, lasting about 30 minutes at a time. Had associated discomfort in bilateral arms.     Patient admitted with an NSTEMI and started on a Heparin gtt   EKG with ST depressions in V4-V6 and trop elevation consistent with NSTEMI s/p ASA load. Trop-i 5.3 -> 4.6.   10/3: s/p LHC with no stents placed, LAD showed 30% stenosis  Transthoracic Echo: severe MS with rheumatic heart disease   Patient is to continue with ASA 81mg, home atenolol, and Eliquis   Patient dx with UTI during hospital stay which was uncomplicated. Pt. denies any dysuria and is afebrile. Completed 3 day course of IV Ceftriaxone on 10/4.   Pt. has known history of TIAs. CT head shows chronic bilateral cerebellar and right caudate nucleus infarcts. Pt. is to continue home Eliquis, Atorvastatin, and to follow up with cardiology if needed antiplatelet agent     Patient is medically cleared for discharge and will go home with no PT needs. 81F Hx of TIA ~10 yrs ago (on eliquis 2.5mg BID), Mitral Stenosis d/t rheumatic fever in childhood, L hip fracture 5 years ago not amenable to surgery (uses cane) presented to [place] ED on 10/1 for intermittent chest discomfort bilateral arm discomfort for several days. Patient admitted to mild chest discomfort intermittently throughout last several days, lasting about 30 minutes at a time. Had associated discomfort in bilateral arms.     Patient admitted with an NSTEMI and started on a Heparin gtt   EKG with ST depressions in V4-V6 and trop elevation consistent with NSTEMI s/p ASA load. Trop-i 5.3 -> 4.6.   10/3: s/p LHC with no stents placed, LAD showed 30% stenosis  Transthoracic Echo: severe MS with rheumatic heart disease   Patient is to continue with ASA 81mg, home atenolol, and Eliquis   Patient dx with UTI during hospital stay which was uncomplicated. Pt. denies any dysuria and is afebrile. Completed 3 day course of IV Ceftriaxone on 10/4.   Pt. has known history of TIAs. CT head shows chronic bilateral cerebellar and right caudate nucleus infarcts. Pt. is to continue home Eliquis, Atorvastatin, per cards only c/w ASA    Patient is medically cleared for discharge and will go home with no PT needs.

## 2021-10-04 NOTE — PROGRESS NOTE ADULT - SUBJECTIVE AND OBJECTIVE BOX
[facility] Division of Hospital Medicine  Day Willingham MD  Pager (M-F, 8A-5P): [phone]  Other Times:  [phone]      SUBJECTIVE / OVERNIGHT EVENTS: Pt seen and examined at bedside this morning. She appears well. NAD. She denies any chest pain, n/v/d, sob, lightheadedness or other symptoms.    MEDICATIONS  (STANDING):  aspirin enteric coated 81 milliGRAM(s) Oral daily  ATENolol  Tablet 25 milliGRAM(s) Oral daily  atorvastatin 40 milliGRAM(s) Oral at bedtime  cefTRIAXone   IVPB 1000 milliGRAM(s) IV Intermittent every 24 hours  clopidogrel Tablet 75 milliGRAM(s) Oral daily  heparin  Infusion.  Unit(s)/Hr (6.5 mL/Hr) IV Continuous <Continuous>  influenza   Vaccine 0.5 milliLiter(s) IntraMuscular once    MEDICATIONS  (PRN):  acetaminophen   Tablet .. 650 milliGRAM(s) Oral every 6 hours PRN Temp greater or equal to 38C (100.4F), Mild Pain (1 - 3)  heparin   Injectable 3200 Unit(s) IV Push every 6 hours PRN For aPTT less than 40  melatonin 3 milliGRAM(s) Oral at bedtime PRN Insomnia      I&O's Summary    02 Oct 2021 07:01  -  02 Oct 2021 13:44  --------------------------------------------------------  IN: 200 mL / OUT: 0 mL / NET: 200 mL        PHYSICAL EXAM:  Vital Signs Last 24 Hrs  T(C): 36.4 (02 Oct 2021 11:37), Max: 36.7 (01 Oct 2021 18:05)  T(F): 97.5 (02 Oct 2021 11:37), Max: 98.1 (01 Oct 2021 18:05)  HR: 71 (02 Oct 2021 11:37) (67 - 85)  BP: 114/68 (02 Oct 2021 11:37) (95/59 - 151/72)  BP(mean): --  RR: 17 (02 Oct 2021 11:37) (17 - 21)  SpO2: 95% (02 Oct 2021 11:37) (94% - 97%)  CONSTITUTIONAL: NAD, well-developed, well-groomed  EYES: PERRLA; conjunctiva and sclera clear  ENMT: Moist oral mucosa, no pharyngeal injection or exudates; normal dentition  NECK: Supple, no palpable masses; no thyromegaly  RESPIRATORY: Normal respiratory effort; lungs are clear to auscultation bilaterally  CARDIOVASCULAR: Regular rate and rhythm, normal S1 and S2, no murmur/rub/gallop; No lower extremity edema; Peripheral pulses are 2+ bilaterally  ABDOMEN: Nontender to palpation, normoactive bowel sounds, no rebound/guarding; No hepatosplenomegaly  MUSCULOSKELETAL:  Normal gait; no clubbing or cyanosis of digits; no joint swelling or tenderness to palpation  PSYCH: A+O to person, place, and time; affect appropriate  NEUROLOGY: CN 2-12 are intact and symmetric; no gross sensory deficits   SKIN: No rashes; no palpable lesions    LABS:                        13.5   10.45 )-----------( 90       ( 02 Oct 2021 06:34 )             41.7     10-02    142  |  103  |  14  ----------------------------<  107<H>  3.5   |  22  |  0.70    Ca    9.6      02 Oct 2021 06:34  Phos  2.8     10-02  Mg     1.9     10-02    TPro  6.9  /  Alb  4.2  /  TBili  0.8  /  DBili  x   /  AST  45<H>  /  ALT  22  /  AlkPhos  91  10-02    PT/INR - ( 01 Oct 2021 11:41 )   PT: 15.1 sec;   INR: 1.32 ratio         PTT - ( 02 Oct 2021 11:23 )  PTT:64.3 sec  CARDIAC MARKERS ( 01 Oct 2021 16:22 )  4.630 ng/mL / x     / x     / x     / x      CARDIAC MARKERS ( 01 Oct 2021 11:41 )  5.300 ng/mL / x     / x     / x     / x          Urinalysis Basic - ( 01 Oct 2021 14:15 )    Color: Yellow / Appearance: Clear / S.010 / pH: x  Gluc: x / Ketone: Negative  / Bili: Negative / Urobili: Negative mg/dL   Blood: x / Protein: Negative mg/dL / Nitrite: Positive   Leuk Esterase: Trace / RBC: 3-5 /HPF / WBC 6-10   Sq Epi: x / Non Sq Epi: Few / Bacteria: Many          RADIOLOGY & ADDITIONAL TESTS:  Results Reviewed:   Imaging Personally Reviewed:  Electrocardiogram Personally Reviewed:    COORDINATION OF CARE:  Care Discussed with Consultants/Other Providers [Y/N]:  Prior or Outpatient Records Reviewed [Y/N]:  
  Patient is a 81y old  Female who presents with a chief complaint of Transfer for NSTEMI (03 Oct 2021 15:03)      SUBJECTIVE / OVERNIGHT EVENTS: No ON events. No complaints Feels well, wants to go home. Denies cp, sob, dizziness, palpitations, n/v.     Tele reviewed: sinus 60-80      ADDITIONAL REVIEW OF SYSTEMS: Negative except for above    MEDICATIONS  (STANDING):  apixaban 2.5 milliGRAM(s) Oral every 12 hours  aspirin enteric coated 81 milliGRAM(s) Oral daily  ATENolol  Tablet 25 milliGRAM(s) Oral daily  atorvastatin 40 milliGRAM(s) Oral at bedtime  clopidogrel Tablet 75 milliGRAM(s) Oral daily  influenza   Vaccine 0.5 milliLiter(s) IntraMuscular once  oxybutynin 5 milliGRAM(s) Oral two times a day    MEDICATIONS  (PRN):  acetaminophen   Tablet .. 650 milliGRAM(s) Oral every 6 hours PRN Temp greater or equal to 38C (100.4F), Mild Pain (1 - 3)  melatonin 3 milliGRAM(s) Oral at bedtime PRN Insomnia      CAPILLARY BLOOD GLUCOSE        I&O's Summary    03 Oct 2021 07:01  -  04 Oct 2021 07:00  --------------------------------------------------------  IN: 1204 mL / OUT: 0 mL / NET: 1204 mL        PHYSICAL EXAM:  Vital Signs Last 24 Hrs  T(C): 36.4 (04 Oct 2021 11:12), Max: 37.2 (03 Oct 2021 20:12)  T(F): 97.6 (04 Oct 2021 11:12), Max: 98.9 (03 Oct 2021 20:12)  HR: 68 (04 Oct 2021 11:12) (63 - 77)  BP: 105/58 (04 Oct 2021 11:12) (95/58 - 118/62)  BP(mean): --  RR: 18 (04 Oct 2021 11:12) (17 - 18)  SpO2: 96% (04 Oct 2021 11:12) (94% - 96%)    PHYSICAL EXAM:  GENERAL: NAD, well-developed  HEAD:  Atraumatic, Normocephalic  NECK: Supple, No JVD  CHEST/LUNG: Clear to auscultation bilaterally; No wheeze  HEART: Regular rate and rhythm  ABDOMEN: Soft, Nontender, Nondistended; Bowel sounds present  EXTREMITIES:  2+ Peripheral Pulses, No clubbing, cyanosis, or edema. R groin no swelling, bleeding.   PSYCH: AAOx3  NEUROLOGY: non-focal        LABS:                        12.2   8.44  )-----------( 102      ( 04 Oct 2021 05:11 )             36.4     10-04    144  |  107  |  18  ----------------------------<  114<H>  3.6   |  22  |  0.72    Ca    8.7      04 Oct 2021 05:11  Mg     2.0     10-04      PTT - ( 04 Oct 2021 05:11 )  PTT:94.9 sec  CARDIAC MARKERS ( 03 Oct 2021 08:19 )  x     / x     / 129 U/L / x     / 3.7 ng/mL            RADIOLOGY & ADDITIONAL TESTS:    Imaging Personally Reviewed: TTE: Conclusions:  1. Calcified mitral leaflets with calcifiied chordae with  doming and thickening of the tips of the mitral valve  consistent with rheumatic mitral valve disease. Peak mitral  valve gradient equals 15 mm Hg, mean transmitral valve  gradient equals 11 mm Hg, consistent with severe mitral  stenosis. (HRabout 70 bpm) Mitral valve area by pressure  half-timeabout 1.2 cmsq. Calculated MV area by continuity  equationabout 0.9 cmsq. (LVOT diam 2.4 cm, LVOT VTI 13 cm,  MV VTI 62 cm)  2. Aortic valve not well visualized; appears to be a  calcified trileaflet valve with normal opening. Minimal  aortic regurgitation.  3. Endocardial visualization enhanced with intravenous  injection of Ultrasonic Enhancing Agent (Definity). Mild  global left ventricular systolic dysfunction.  4. Normal right ventricular size and function.  *** No previous Echo exam.      Electrocardiogram Personally Reviewed:    COORDINATION OF CARE:  Care Discussed with Consultants/Other Providers [Y/N]:  Prior or Outpatient Records Reviewed [Y/N]:  
[facility] Division of Hospital Medicine  Day Willingham MD  Pager (M-F, 8A-5P): [phone]  Other Times:  [phone]      SUBJECTIVE / OVERNIGHT EVENTS: Pt seen and examined at bedside this morning. She is s/p Cardiac cath. There were no interventions performed.     MEDICATIONS  (STANDING):  aspirin enteric coated 81 milliGRAM(s) Oral daily  ATENolol  Tablet 25 milliGRAM(s) Oral daily  atorvastatin 40 milliGRAM(s) Oral at bedtime  clopidogrel Tablet 75 milliGRAM(s) Oral daily  heparin  Infusion. 500 Unit(s)/Hr (5 mL/Hr) IV Continuous <Continuous>  influenza   Vaccine 0.5 milliLiter(s) IntraMuscular once    MEDICATIONS  (PRN):  acetaminophen   Tablet .. 650 milliGRAM(s) Oral every 6 hours PRN Temp greater or equal to 38C (100.4F), Mild Pain (1 - 3)  heparin   Injectable 4000 Unit(s) IV Push every 6 hours PRN For aPTT less than 40  heparin   Injectable 2000 Unit(s) IV Push every 6 hours PRN For aPTT between 40 - 57  melatonin 3 milliGRAM(s) Oral at bedtime PRN Insomnia      I&O's Summary    02 Oct 2021 07:01  -  03 Oct 2021 07:00  --------------------------------------------------------  IN: 1100 mL / OUT: 0 mL / NET: 1100 mL    03 Oct 2021 07:01  -  03 Oct 2021 15:04  --------------------------------------------------------  IN: 440 mL / OUT: 0 mL / NET: 440 mL        PHYSICAL EXAM:  Vital Signs Last 24 Hrs  T(C): 36.6 (03 Oct 2021 08:28), Max: 36.8 (02 Oct 2021 20:51)  T(F): 97.8 (03 Oct 2021 08:28), Max: 98.2 (02 Oct 2021 20:51)  HR: 68 (03 Oct 2021 11:42) (68 - 92)  BP: 107/56 (03 Oct 2021 11:42) (95/60 - 124/78)  BP(mean): --  RR: 18 (03 Oct 2021 11:42) (16 - 18)  SpO2: 95% (03 Oct 2021 11:42) (94% - 98%)  CONSTITUTIONAL: NAD, well-developed, well-groomed  EYES: PERRLA; conjunctiva and sclera clear  ENMT: Moist oral mucosa, no pharyngeal injection or exudates; normal dentition  NECK: Supple, no palpable masses; no thyromegaly  RESPIRATORY: Normal respiratory effort; lungs are clear to auscultation bilaterally  CARDIOVASCULAR: Regular rate and rhythm, normal S1 and S2, no murmur/rub/gallop; No lower extremity edema; Peripheral pulses are 2+ bilaterally  ABDOMEN: Nontender to palpation, normoactive bowel sounds, no rebound/guarding; No hepatosplenomegaly  MUSCULOSKELETAL:  Normal gait; no clubbing or cyanosis of digits; no joint swelling or tenderness to palpation  PSYCH: A+O to person, place, and time; affect appropriate  NEUROLOGY: CN 2-12 are intact and symmetric; no gross sensory deficits   SKIN: No rashes; no palpable lesions    LABS:                        13.3   8.53  )-----------( 94       ( 03 Oct 2021 08:19 )             41.0     10-03    142  |  104  |  16  ----------------------------<  151<H>  3.6   |  23  |  0.73    Ca    9.0      03 Oct 2021 08:19  Phos  2.8     10-02  Mg     1.9     10-02    TPro  6.9  /  Alb  4.2  /  TBili  0.8  /  DBili  x   /  AST  45<H>  /  ALT  22  /  AlkPhos  91  10-02    PTT - ( 03 Oct 2021 04:52 )  PTT:66.9 sec  CARDIAC MARKERS ( 03 Oct 2021 08:19 )  x     / x     / 129 U/L / x     / 3.7 ng/mL  CARDIAC MARKERS ( 01 Oct 2021 16:22 )  4.630 ng/mL / x     / x     / x     / x                RADIOLOGY & ADDITIONAL TESTS:  Results Reviewed:   Imaging Personally Reviewed:  Electrocardiogram Personally Reviewed:    COORDINATION OF CARE:  Care Discussed with Consultants/Other Providers [Y/N]:  Prior or Outpatient Records Reviewed [Y/N]:

## 2021-10-04 NOTE — DISCHARGE NOTE PROVIDER - NSDCMRMEDTOKEN_GEN_ALL_CORE_FT
ATENOLOL  25 MG TABS: 1 tab(s) orally once a day  ATORVASTATIN 40MG TABS: TAKE 1 TABLET ONCE DAILY  Eliquis 2.5 mg oral tablet: 1 tab(s) orally 2 times a day  KLOR-CON 10MEQ : TAKE 1 TABLET DAILY MUST SEE DOCTOR FOR MORE REFILLS  LOSARTAN HCT TAB 50-12.5: TAKE 1 TABLET ONCE DAILY   aspirin 81 mg oral delayed release tablet: 1 tab(s) orally once a day  ATENOLOL  25 MG TABS: 1 tab(s) orally once a day  ATORVASTATIN 40MG TABS: TAKE 1 TABLET ONCE DAILY  Eliquis 2.5 mg oral tablet: 1 tab(s) orally 2 times a day  KLOR-CON 10MEQ : TAKE 1 TABLET DAILY MUST SEE DOCTOR FOR MORE REFILLS  LOSARTAN HCT TAB 50-12.5: TAKE 1 TABLET ONCE DAILY

## 2021-10-04 NOTE — PROGRESS NOTE ADULT - PROBLEM SELECTOR PROBLEM 2
UTI (urinary tract infection), uncomplicated
UTI (urinary tract infection), uncomplicated
Severe mitral valve stenosis

## 2021-10-04 NOTE — DISCHARGE NOTE PROVIDER - CARE PROVIDERS DIRECT ADDRESSES
[email or web address]

## 2021-10-04 NOTE — PROGRESS NOTE ADULT - PROBLEM SELECTOR PLAN 6
Diet: DASH  DVT ppx:  heparin gtt  PT consult - no needs
Hx of TIA 10 years ago, has been on eliquis. 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      - d/c IV heparin ggt, switch to home eliquis 2.5mg bid  - f/u with cards if needed antiplatelet agent  - Atorvastatin 80mg QHS
Diet: DASH  DVT ppx:  heparin gtt  PT consult - no needs

## 2021-10-04 NOTE — PROGRESS NOTE ADULT - PROBLEM SELECTOR PROBLEM 1
NSTEMI (non-ST elevation myocardial infarction)

## 2021-10-04 NOTE — DISCHARGE NOTE NURSING/CASE MANAGEMENT/SOCIAL WORK - PATIENT PORTAL LINK FT
You can access the FollowMyHealth Patient Portal offered by [facility] by registering at the following website: [email or web address]. By joining [employer]’s FollowMyHealth portal, you will also be able to view your health information using other applications (apps) compatible with our system.

## 2021-10-04 NOTE — DISCHARGE NOTE PROVIDER - NSDCCPCAREPLAN_GEN_ALL_CORE_FT
PRINCIPAL DISCHARGE DIAGNOSIS  Diagnosis: NSTEMI (non-ST elevation myocardial infarction)  Assessment and Plan of Treatment: Continue ASA and Eliquis      SECONDARY DISCHARGE DIAGNOSES  Diagnosis: UTI (urinary tract infection), uncomplicated  Assessment and Plan of Treatment: Completed ceftriaxone    Diagnosis: History of TIAs  Assessment and Plan of Treatment:     Diagnosis: Severe mitral valve stenosis  Assessment and Plan of Treatment:      PRINCIPAL DISCHARGE DIAGNOSIS  Diagnosis: NSTEMI (non-ST elevation myocardial infarction)  Assessment and Plan of Treatment: Continue ASA and Eliquis      SECONDARY DISCHARGE DIAGNOSES  Diagnosis: UTI (urinary tract infection), uncomplicated  Assessment and Plan of Treatment: Completed ceftriaxone  Monitor for fevers and dysuria    Diagnosis: History of TIAs  Assessment and Plan of Treatment:     Diagnosis: Severe mitral valve stenosis  Assessment and Plan of Treatment:      PRINCIPAL DISCHARGE DIAGNOSIS  Diagnosis: NSTEMI (non-ST elevation myocardial infarction)  Assessment and Plan of Treatment: Continue ASA and statin  outpatient cardiology followup      SECONDARY DISCHARGE DIAGNOSES  Diagnosis: UTI (urinary tract infection), uncomplicated  Assessment and Plan of Treatment: Completed ceftriaxone  Monitor for fevers and burning    Diagnosis: History of TIAs  Assessment and Plan of Treatment: continue medications followup    Diagnosis: Severe mitral valve stenosis  Assessment and Plan of Treatment: outpatient cardiology follwup     PRINCIPAL DISCHARGE DIAGNOSIS  Diagnosis: NSTEMI (non-ST elevation myocardial infarction)  Assessment and Plan of Treatment: Monitor site of procedure for any redness, swelling, bleeding and notify your doctor. no heavy lifting over 5 lbs for 1 week, no strenuous activity for 3 weeks, if area becomes red looks bruised or swollen call MD or come to emergency department.  Monitor site of procedure and notify your doctor for any redness/swelling/drainage.  You may shower but no baths or swimming for one week or until skin is healed.  Continue ASA and statin  outpatient cardiology followup      SECONDARY DISCHARGE DIAGNOSES  Diagnosis: UTI (urinary tract infection), uncomplicated  Assessment and Plan of Treatment: HOME CARE INSTRUCTIONS  If you were prescribed antibiotics, take them exactly as your caregiver instructs you. Finish the medication even if you feel better after you have only taken some of the medication.  Drink enough water and fluids to keep your urine clear or pale yellow.  Avoid caffeine, tea, and carbonated beverages. They tend to irritate your bladder.  Empty your bladder often. Avoid holding urine for long periods of time.  Empty your bladder before and after sexual intercourse.  After a bowel movement, women should cleanse from front to back. Use each tissue only once.  SEEK MEDICAL CARE IF:  You have back pain.  You develop a fever.  Your symptoms do not begin to resolve within 3 days.  SEEK IMMEDIATE MEDICAL CARE IF:  You have severe back pain or lower abdominal pain.  You develop chills.  You have nausea or vomiting.  You have continued burning or discomfort with urination.  Completed ceftriaxone  Monitor for fevers and burning    Diagnosis: History of TIAs  Assessment and Plan of Treatment: Continue medications followup    Diagnosis: Severe mitral valve stenosis  Assessment and Plan of Treatment: outpatient cardiology follow up for managment

## 2021-10-04 NOTE — DISCHARGE NOTE NURSING/CASE MANAGEMENT/SOCIAL WORK - NSDCFUADDAPPT_GEN_ALL_CORE_FT
APPTS ARE READY TO BE MADE: [x ] YES    Best Family or Patient Contact (if needed):    Additional Information about above appointments (if needed):    1:   2:   3:     Other comments or requests:

## 2021-10-04 NOTE — PROGRESS NOTE ADULT - REASON FOR ADMISSION
Transfer for [facility]
Transfer for [facility]
Transfer for [facility]

## 2021-10-04 NOTE — PROGRESS NOTE ADULT - PROBLEM SELECTOR PLAN 3
Stable  Possibly consumptive iso MI.    - Heparin gtt  - Check CBC daily
Thrombocytopenia to 92. Possibly consumptive iso MI.    - Heparin gtt  - Check CBC daily
Urinary frequency is chronic for her, more likely related to overactive bladder then UTI  No dysuria, afebrile, no leukocytosis   UA with +LE, 6-10 wbc with some epithelial cells  c/w Oxybutinin 5mg PO BID  completing Day 3 of 3 of IV ceftriaxone today

## 2021-10-04 NOTE — PROGRESS NOTE ADULT - PROBLEM SELECTOR PLAN 2
Urinary frequency unchanged, more likely related to overactive bladder    Start Oxybutinin 5mg PO BID  continue Ceftriaxone 1g IVPB today and dc tomorrow
UA positive, with increased urinary frequency    - S/p Ceftriaxone 1g at [facility]  - c/w Ceftriaxone 1g QD for total 3 days - Today is Day 2
-TTE: EF 46: severe MR with rheumatic heart disease  -c/w atenolol  -f/u cards if any further recs

## 2021-10-04 NOTE — PROGRESS NOTE ADULT - PROBLEM SELECTOR PLAN 4
Episode of shaking prior to ED presentation. No alteration in mental status, LOC, confusion per patient.  Possibly chills vs. seizure.    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  - monitor for fevers
Episode of shaking prior to ED presentation. No alteration in mental status, LOC, confusion per patient.  Possibly chills vs. seizure.    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  - monitor for fevers
unclear etiology but stable no bleeding  outpatient pcp followup

## 2021-10-04 NOTE — PROGRESS NOTE ADULT - PROBLEM SELECTOR PLAN 1
Intermittent chest discomfort with bilateral arm discomfort for several days. EKG with ST depressions in V4-V6 and trop elevation consistent with NSTEMI. Trop-i 5.3 -> 4.6.     s/p LHC, no stents placed, f/u report  Continue ASA 81mg daily.  Resume heparin gtt at 4:30pm  continue plavix 75mg daily  - Atorvastatin 40mg QHS  - Monitor on telemetry  TTE pending Monitor I/Os, daily weight.  HbA1c is 5.9%  - C/w home atenolol 25mg daily to maintain HR 50-60  - Hold losartan-HCTZ 5-12.5 daily   - F/u Cardiology recs
Intermittent chest discomfort with bilateral arm discomfort for several days. EKG with ST depressions in V4-V6 and trop elevation consistent with NSTEMI. Trop-i 5.3 -> 4.6.     -symptoms resolved  -s/p LHC, no stents placed, LAD 30 % stenosis  -TTE: EF 46: severe MS with rheumatic heart disease  -on asa/plavix  -f/u with cards if should stop both or continue 1 antiplatelet agent  -c/w Atorvastatin 40mg QHS  - Monitor on telemetry  - C/w home atenolol 25mg daily   - F/u Cardiology recs
Intermittent chest discomfort with bilateral arm discomfort for several days. EKG with ST depressions in V4-V6 and trop elevation consistent with NSTEMI. Trop-i 5.3 -> 4.6.     - S/p  x2 at [facility]. Continue ASA 81mg daily.  continue heparin gtt  continue plavix 75mg daily  - Atorvastatin 40mg QHS  - Monitor on telemetry  TTE pending Monitor I/Os, daily weight.  F/u A1c  - C/w home atenolol 25mg daily to maintain HR 50-60  - Hold losartan-HCTZ 5-12.5 daily   - F/u Cardiology recs re: [facility]

## 2021-10-04 NOTE — DISCHARGE NOTE PROVIDER - CARE PROVIDER_API CALL
Teddy Hinkle  CARDIOVASCULAR DISEASE  [address] [place] [address]  Phone: [phone]  Fax: [phone]  Follow Up Time:

## 2021-10-04 NOTE — DISCHARGE NOTE PROVIDER - NSDCFUADDAPPT_GEN_ALL_CORE_FT
Follow up with Cardiologist Dr. Hinkle      APPTS ARE READY TO BE MADE: [x ] YES    Best Family or Patient Contact (if needed):    Additional Information about above appointments (if needed):    1:   2:   3:     Other comments or requests:    APPTS ARE READY TO BE MADE: [x ] YES    Best Family or Patient Contact (if needed):    Additional Information about above appointments (if needed):    1:   2:   3:     Other comments or requests:   Patient was provided with information for Teddy Hinkle, and was advised to call to schedule follow-up within specified time frame.

## 2021-10-04 NOTE — PROGRESS NOTE ADULT - PROBLEM SELECTOR PLAN 7
Diet: DASH  DVT ppx:  eliquis  PT consult - no needs    Dispo: discharge home today if cleared by cards with outpatient PCP and cards followup  spent 45 min on d/c time    discussed with CRISTOBAL Alejandra

## 2023-04-29 NOTE — PRE-OP CHECKLIST - TEMPERATURE IN FAHRENHEIT (DEGREES F)
[facility] Hospitalist Discharge Summary    MRN: [id number], Katelynn Gruber is a 86 year old female admitted for   Chief Complaint   Patient presents with   • Cardiac Arrest         Reason for admission:  unresposiveness      Admission Dt/Tm     4/21/2023  3:09 PM  Discharge DT/TM  4/29/2023  6:00 AM      Consultants:   ICU  Neuro      History Of Present Illness  Katelynn is a 85 year old female w/ a H/O DM, HTN, CAD s/p CABG, dementia, asthma, nonverbal who presented via EMS from home for unresposiveness. Of note, was admitted to [facility] for acute hypoxic and hypercarbic respiratory failure 4/7-11, then 4/15-4/20; during the last hospitalization, was recommended palliative/hospice however after some back-and-forth family ultimately refused. EMS reported that sats were in the 40s on initial evaluation and she did not have a pulse. PEA arrest, s/p ROSC after 2 rounds of CPR and epi, intubated PTA. ED vitals: T 94.5, P103, BP 63/44. Labs significant for bicarb 39, glucose 418, anion gap 6, LA 6.0. NTproBNP 3609. iCa 1.08. Albumin 1.9, , ALT 60, .  Hgb 8.7. ABG on ACVC+ R 28/Vt 400/100%/PEEP 5 with pH 7.17, pCO2 > 120, pO2 80. Admitted to CCU on norepinephrine, currently requiring 5 mcg/min.      Son at bedside states that pt appeared to be awake and conversant yesterday morning, but that they had not connected with a home health agency to ensure that an AVAPS machine would arrive yesterday after discharge. Son expresses frustration about this and says that pt's cardiac arrest could have been avoided if she only had an AVAPS machine that came in before she were discharged, says that this morning she appeared to be breathing heavily and craning her neck to take in deeper breaths. Son called [facility] nurse, who recommended that he call EMS. ~5-6 minutes later, when EMS was walking in the house, pt's abdomen suddenly became still, indicating she was no longer breathing. Son states that he gave the ok for EMS to intubate and do  chest compressions, and that he would like for all life-prolonging measures to be taken for now.          Hospital Course  Cardiac arrest with pulseless electrical activity (CMD)  Cardiogenic shock (CMD)  Assessment & Plan  - PEA arrest likely 2/2 hypoxia, hypercarbia   -prognosis guarded   -Neurology on  Board.Agree with poor prognosis.  -Palliative extubation 4/27     Acute respiratory failure with hypoxia and hypercarbia (CMD)  Aspiration PNA   - Pt intubated prior to arrival, initial ABG here with pH 7.17, pCO2 120, pO2 80     - palliative extubation 4/27     Hx RLL PE 12/2021  - Eliquis on hold      Paroxysmal atrial fibrillation (CMD)  - [id number] at least 6 (CHF, HTN, agex2, sex, DM2)  - Eliquis on hold      Pleural effusion, bilateral (R>L)  - Noted on CT chest  - Not large enough for thoracentesis     Protein-calorie malnutrition, severe (CMD)  - On pureed diet prior to arrival  --comfort care      S/P CABG (coronary artery bypass graft)     Acute on chronic diastolic heart failure (CMD)  - NTproBNP 3609 on admission  - CT chest with evidence of pulmonary edema and small bilateral pleural effusion     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     Physical Exam  none    Discharge Medications     Summary of your Discharge Medications      You have not been prescribed any medications.         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 (CMD)    Pleural effusion, bilateral (R>L)    Closed traumatic thoracolumbar vertebral compression fractures, initial encounter (CMD)    Severe hyperglycemia due to diabetes mellitus (CMD)    Reactive airway disease without complication    Atherosclerosis of native coronary artery of native heart without angina pectoris    Nontoxic multinodular goiter    Cardiogenic shock (CMD)    Microalbuminuria due to type 2 diabetes mellitus (CMD)    Hx RLL PE 12/2021    Closed traumatic nondisplaced fracture of multiple ribs, initial encounter    Discussion about advance care planning held with family member    Pressure injury of deep tissue of sacral region    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           Imaging  CT CHEST WO CONTRAST   Final Result      1.  Mild bilateral pleural effusions and moderate amount of hydrostatic   pulmonary edema within the lungs.      2.  Patchy opacities bilaterally in the lungs right greater than left   suspicious for aspiration given the history of cardiac arrest.      3.  Bilateral anterior acute rib fractures without displacement may be   secondary to CPR.      4.  Multilevel thoracolumbar osteoporotic compression fractures which are   felt to be a combination of acute and chronic.  The worst is of T7 which   shows moderate height loss and minimal bony retropulsion.  Further   evaluation with noncontrast MRI imaging of the thoracic and lumbar spine   recommended.      5.  Subacute proximal left humeral fracture with surrounding callus   formation and soft tissue swelling.      6.  Other findings are detailed above.      Electronically Signed by: MARIELENA GIBBONS M.D.    Signed on: 4/21/2023 5:52 PM     Workstation ID: [id number]      CT ABDOMEN PELVIS WO CONTRAST   Final Result       1.  Findings that can be secondary to stercoral colitis or rectal fecal   impaction.      2.  Bilateral anterior rib fractures are acute in keeping with changes from   CPR.      3.  Compression fractures of L1 and L2 as above favor chronic osteoporotic   fractures.  Recent fracture not excluded.  MRI lumbar spine without   contrast recommended.      4.  Severe subcutaneous edema the body wall.      5.  Multiple chronic abnormalities are detailed above.      Electronically Signed by: MARIELENA GIBBONS M.D.    Signed on: 4/21/2023 5:59 PM    Workstation ID: [id number]      CT HEAD WO CONTRAST   Final Result      1.  No acute intracranial process.      2.  Moderate/severe brain volume loss that is suspicious for Alzheimer's   dementia.      3.  Mild/moderate chronic microvascular ischemic change.      4.  Severe dental disease.      5.  Orogastric and endotracheal tubes in place.      6.  Other findings as above.          Electronically Signed by: MARIELENA GIBBONS M.D.    Signed on: 4/21/2023 5:42 PM    Workstation ID: [id number]      XR CHEST PA OR AP 1 VIEW   Final Result      As above.      Electronically Signed by: SYLVIE GUEVARA M.D.    Signed on: 4/21/2023 3:50 PM    Workstation ID: [id number]            Discharge Instructions:        Outpatient Follow Up  Follow-up Information    None       PCP:  Daniel Neal MD Emaad Basith, MD  4/29/2023      97.8

## 2023-06-12 ENCOUNTER — NON-APPOINTMENT (OUTPATIENT)
Age: 83
End: 2023-06-12

## 2023-06-15 ENCOUNTER — NON-APPOINTMENT (OUTPATIENT)
Age: 83
End: 2023-06-15

## 2023-07-09 NOTE — PROVIDER CONTACT NOTE (CRITICAL VALUE NOTIFICATION) - SITUATION
Troponin high sensitivity 505
Troponin 484
.2
IV and/or equipment tethered to patient/Other
Cibinqo Pregnancy And Lactation Text: It is unknown if this medication will adversely affect pregnancy or breast feeding.  You should not take this medication if you are currently pregnant or planning a pregnancy or while breastfeeding.

## 2023-12-29 NOTE — ED ADULT NURSE NOTE - DRUG PRE-SCREENING (DAST -1)
Report given to NICU RN.  Nurse aware of bedrest parameters and SBP monitoring parameters.  Dressing C/D/I.  VSS. Statement Selected